# Patient Record
Sex: MALE | Race: WHITE | NOT HISPANIC OR LATINO
[De-identification: names, ages, dates, MRNs, and addresses within clinical notes are randomized per-mention and may not be internally consistent; named-entity substitution may affect disease eponyms.]

---

## 2019-02-21 ENCOUNTER — RESULT CHARGE (OUTPATIENT)
Age: 64
End: 2019-02-21

## 2019-02-22 ENCOUNTER — APPOINTMENT (OUTPATIENT)
Dept: INTERNAL MEDICINE | Facility: CLINIC | Age: 64
End: 2019-02-22
Payer: COMMERCIAL

## 2019-02-22 ENCOUNTER — NON-APPOINTMENT (OUTPATIENT)
Age: 64
End: 2019-02-22

## 2019-02-22 VITALS
WEIGHT: 217 LBS | HEIGHT: 72 IN | BODY MASS INDEX: 29.39 KG/M2 | HEART RATE: 79 BPM | DIASTOLIC BLOOD PRESSURE: 94 MMHG | SYSTOLIC BLOOD PRESSURE: 154 MMHG

## 2019-02-22 DIAGNOSIS — G89.29 PAIN IN RIGHT SHOULDER: ICD-10-CM

## 2019-02-22 DIAGNOSIS — I83.90 ASYMPTOMATIC VARICOSE VEINS OF UNSPECIFIED LOWER EXTREMITY: ICD-10-CM

## 2019-02-22 DIAGNOSIS — M25.512 PAIN IN RIGHT SHOULDER: ICD-10-CM

## 2019-02-22 DIAGNOSIS — M25.511 PAIN IN RIGHT SHOULDER: ICD-10-CM

## 2019-02-22 DIAGNOSIS — Z82.49 FAMILY HISTORY OF ISCHEMIC HEART DISEASE AND OTHER DISEASES OF THE CIRCULATORY SYSTEM: ICD-10-CM

## 2019-02-22 PROCEDURE — 36415 COLL VENOUS BLD VENIPUNCTURE: CPT

## 2019-02-22 PROCEDURE — 93000 ELECTROCARDIOGRAM COMPLETE: CPT

## 2019-02-22 PROCEDURE — 99396 PREV VISIT EST AGE 40-64: CPT | Mod: 25

## 2019-02-22 PROCEDURE — 81002 URINALYSIS NONAUTO W/O SCOPE: CPT

## 2019-02-22 RX ORDER — ASPIRIN 81 MG/1
81 TABLET ORAL DAILY
Refills: 0 | Status: ACTIVE | COMMUNITY
Start: 2019-02-22

## 2019-02-22 NOTE — HISTORY OF PRESENT ILLNESS
[FreeTextEntry1] : The patient is here for periodic reevaluation.\par \par He is known to have coronary artery disease, varicose veins, and an elevated cholesterol. 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  Additionally, he associates athletic events, and is not symptomatic when doing so.\par \par Family, the daily, cigarette smoker, he now smokes modestly and infrequently.  He has not had a chest CAT scan.  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  He has not had symptoms since that time.  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

## 2019-02-22 NOTE — RESULTS/DATA
[NSR] : normal sinus rhythm [LAD] : left axis deviation [Q Waves] : Q-waves are present [III] : III [AVF] : AVF [Inferior Wall] : of the inferior wall [No Interval Change] : no interval change

## 2019-02-22 NOTE — PHYSICAL EXAM

## 2019-02-22 NOTE — PHYSICAL EXAM

## 2019-02-22 NOTE — ASSESSMENT
[FreeTextEntry1] : He should have a screening CAT scan of his chest.\par \par He should have an exercise stress test (nuclear).\par \par He should have an echocardiogram and an MINDI\par \par Consideration should be given to reintroduction of statin drugs.

## 2019-02-22 NOTE — PHYSICAL EXAM

## 2019-02-22 NOTE — HISTORY OF PRESENT ILLNESS
[FreeTextEntry1] : The patient is here for periodic reevaluation.\par \par He is known to have coronary artery disease, varicose veins, and an elevated cholesterol. 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  Additionally, he associates athletic events, and is not symptomatic when doing so.\par \par Family, the daily, cigarette smoker, he now smokes modestly and infrequently.  He has not had a chest CAT scan.  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  He has not had symptoms since that time.  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

## 2019-02-22 NOTE — HISTORY OF PRESENT ILLNESS
[FreeTextEntry1] : The patient is here for periodic reevaluation.\par \par He is known to have coronary artery disease, varicose veins, and an elevated cholesterol. 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  Additionally, he associates athletic events, and is not symptomatic when doing so.\par \par Family, the daily, cigarette smoker, he now smokes modestly and infrequently.  He has not had a chest CAT scan.  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  He has not had symptoms since that time.  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

## 2019-02-23 LAB
ALBUMIN SERPL ELPH-MCNC: 4.7 G/DL
ALP BLD-CCNC: 115 U/L
ALT SERPL-CCNC: 29 U/L
APPEARANCE: CLEAR
AST SERPL-CCNC: 30 U/L
BASOPHILS # BLD AUTO: 0.07 K/UL
BASOPHILS NFR BLD AUTO: 1.1 %
BILIRUB SERPL-MCNC: 1 MG/DL
BILIRUBIN URINE: NEGATIVE
BLOOD URINE: NEGATIVE
BUN SERPL-MCNC: 13 MG/DL
CALCIUM SERPL-MCNC: 9.5 MG/DL
CHLORIDE SERPL-SCNC: 95 MMOL/L
CHOLEST SERPL-MCNC: 193 MG/DL
CHOLEST/HDLC SERPL: 4.4 RATIO
CO2 SERPL-SCNC: 25 MMOL/L
COLOR: NORMAL
CREAT SERPL-MCNC: 1.07 MG/DL
EOSINOPHIL # BLD AUTO: 0.24 K/UL
EOSINOPHIL NFR BLD AUTO: 3.8 %
GLUCOSE QUALITATIVE U: NEGATIVE
GLUCOSE SERPL-MCNC: 90 MG/DL
HBA1C MFR BLD HPLC: 5.5 %
HCT VFR BLD CALC: 48.9 %
HDLC SERPL-MCNC: 44 MG/DL
HGB BLD-MCNC: 16.3 G/DL
IMM GRANULOCYTES NFR BLD AUTO: 0.2 %
KETONES URINE: NEGATIVE
LDLC SERPL CALC-MCNC: 129 MG/DL
LEUKOCYTE ESTERASE URINE: NEGATIVE
LYMPHOCYTES # BLD AUTO: 0.65 K/UL
LYMPHOCYTES NFR BLD AUTO: 10.2 %
MAN DIFF?: NORMAL
MCHC RBC-ENTMCNC: 31 PG
MCHC RBC-ENTMCNC: 33.3 GM/DL
MCV RBC AUTO: 93 FL
MONOCYTES # BLD AUTO: 0.86 K/UL
MONOCYTES NFR BLD AUTO: 13.5 %
NEUTROPHILS # BLD AUTO: 4.52 K/UL
NEUTROPHILS NFR BLD AUTO: 71.2 %
NITRITE URINE: NEGATIVE
PH URINE: 6
PLATELET # BLD AUTO: 272 K/UL
POTASSIUM SERPL-SCNC: 5.4 MMOL/L
PROT SERPL-MCNC: 7.5 G/DL
PROTEIN URINE: NEGATIVE
PSA SERPL-MCNC: 0.98 NG/ML
RBC # BLD: 5.26 M/UL
RBC # FLD: 13 %
SODIUM SERPL-SCNC: 146 MMOL/L
SPECIFIC GRAVITY URINE: 1.01
TRIGL SERPL-MCNC: 100 MG/DL
TSH SERPL-ACNC: 0.98 UIU/ML
URATE SERPL-MCNC: 7.8 MG/DL
UROBILINOGEN URINE: NORMAL
WBC # FLD AUTO: 6.35 K/UL

## 2019-02-24 LAB — 25(OH)D3 SERPL-MCNC: 21.5 NG/ML

## 2019-02-25 ENCOUNTER — APPOINTMENT (OUTPATIENT)
Dept: INTERNAL MEDICINE | Facility: CLINIC | Age: 64
End: 2019-02-25

## 2019-03-04 ENCOUNTER — APPOINTMENT (OUTPATIENT)
Dept: INTERNAL MEDICINE | Facility: CLINIC | Age: 64
End: 2019-03-04
Payer: COMMERCIAL

## 2019-03-04 PROCEDURE — 93306 TTE W/DOPPLER COMPLETE: CPT

## 2019-03-04 PROCEDURE — 93922 UPR/L XTREMITY ART 2 LEVELS: CPT

## 2019-03-15 ENCOUNTER — RX RENEWAL (OUTPATIENT)
Age: 64
End: 2019-03-15

## 2019-03-18 ENCOUNTER — APPOINTMENT (OUTPATIENT)
Dept: INTERNAL MEDICINE | Facility: AMBULATORY MEDICAL SERVICES | Age: 64
End: 2019-03-18
Payer: COMMERCIAL

## 2019-03-18 PROBLEM — I83.90 ASYMPTOMATIC VARICOSE VEINS OF LOWER EXTREMITY: Status: ACTIVE | Noted: 2019-03-18

## 2019-03-18 PROBLEM — Z82.49 FAMILY HISTORY OF CORONARY ARTERY DISEASE: Status: ACTIVE | Noted: 2019-03-18

## 2019-03-18 PROCEDURE — G0121 COLON CA SCRN NOT HI RSK IND: CPT

## 2019-03-25 ENCOUNTER — MEDICATION RENEWAL (OUTPATIENT)
Age: 64
End: 2019-03-25

## 2019-03-26 ENCOUNTER — APPOINTMENT (OUTPATIENT)
Dept: INTERNAL MEDICINE | Facility: CLINIC | Age: 64
End: 2019-03-26

## 2019-04-08 ENCOUNTER — APPOINTMENT (OUTPATIENT)
Dept: CARDIOLOGY | Facility: CLINIC | Age: 64
End: 2019-04-08
Payer: COMMERCIAL

## 2019-04-08 PROCEDURE — 93015 CV STRESS TEST SUPVJ I&R: CPT

## 2019-04-08 PROCEDURE — A9500: CPT

## 2019-04-08 PROCEDURE — 78452 HT MUSCLE IMAGE SPECT MULT: CPT

## 2019-05-14 ENCOUNTER — APPOINTMENT (OUTPATIENT)
Dept: ORTHOPEDIC SURGERY | Facility: CLINIC | Age: 64
End: 2019-05-14
Payer: COMMERCIAL

## 2019-05-14 VITALS — HEIGHT: 72 IN | BODY MASS INDEX: 29.39 KG/M2 | WEIGHT: 217 LBS

## 2019-05-14 DIAGNOSIS — M75.122 COMPLETE ROTATOR CUFF TEAR OR RUPTURE OF LEFT SHOULDER, NOT SPECIFIED AS TRAUMATIC: ICD-10-CM

## 2019-05-14 DIAGNOSIS — M67.919 UNSPECIFIED DISORDER OF SYNOVIUM AND TENDON, UNSPECIFIED SHOULDER: ICD-10-CM

## 2019-05-14 PROCEDURE — 99214 OFFICE O/P EST MOD 30 MIN: CPT

## 2019-05-14 NOTE — END OF VISIT
[FreeTextEntry3] : All medical record entries made by the Anitaibe were at my, Dr. Jeff Sarmiento, direction and personally dictated by me on 05/14/2019. I have reviewed the chart and agree that the record accurately reflects my personal performance of the history, physical exam, assessment and plan. I have also personally directed, reviewed, and agreed with the chart.

## 2019-05-14 NOTE — ADDENDUM
[FreeTextEntry1] : I, Humble Forman, acted solely as a scribe for Dr. Jeff Sarimento on this date 05/14/2019.

## 2019-05-14 NOTE — PHYSICAL EXAM
[Normal RUE] : Right Upper Extremity: No scars, rashes, lesions, ulcers, skin intact [Normal LUE] : Left Upper Extremity: No scars, rashes, lesions, ulcers, skin intact [Normal Touch] : sensation intact for touch [Normal] : No swelling, no edema, normal pedal pulses and normal temperature [Poor Appearance] : well-appearing [Acute Distress] : not in acute distress [Obese] : not obese [de-identified] : \par Left Upper Extremity\par o Shoulder :\par ¦ Inspection/Palpation : There is localized tenderness present to the greater tuberosity and proximal biceps, no acromioclavicular tenderness, no swelling, no deformities \par ¦ Range of Motion : ACTIVE FORWARD ELEVATION: Measured at 145 degrees with pain, ACTIVE EXTERNAL ROTATION: Measured at 55 degrees, ACTIVE INTERNAL ROTATION: Measured at L5\par ¦ Strength : external rotation 3/5, internal rotation 4/5, supraspinatus 3/5\par ¦ Stability : no joint instability on provocative testing\par ¦ Tests/Signs : Belly press test (equivocal), Speed's test (+)\par o Upper Arm : no tenderness, no swelling, no deformities\par o Muscle Bulk : no atrophy\par o Sensation : sensation intact to light touch\par o Skin : no skin rash or discoloration\par o Vascular Exam : no edema, no cyanosis, radial and ulnar pulses normal  [de-identified] : MRI left shoulder performed at [facility] on 4/30/2019 was reviewed and reveals: \par Rotator cuff tendinosis and a complete full thickness tear of the subscapularis tendon as well as a a near complete tear of the supraspinatus. Partial-thickness articular sided tearing is seen at the infraspinatus insertion. \par There is a severe long head of the biceps tendinosis with a high grade partial tear in the rotator interval. \par Moderate glenohumeral arthrosis and tearing of the labrum.

## 2019-05-14 NOTE — DISCUSSION/SUMMARY
[de-identified] : The underlying pathophysiology was reviewed in great detail with the patient as well as the various treatment options, including analgesics, NSAIDS, Physical therapy, steroid injections, and surgery. \par \par The patient is now considering SURGICAL INTERVENTION. The risks and benefits of LEFT ROTATOR CUFF REPAIR were discussed in great detail today, including but not limited to bleeding, infection, nerve injury, DVT, allergy to the anesthetic or to the implants, persistent pain, stiffness, scarring, swelling or deformity. \par \par He will get back to us if he decides to go forward with the procedure.

## 2019-05-15 ENCOUNTER — APPOINTMENT (OUTPATIENT)
Dept: INTERNAL MEDICINE | Facility: CLINIC | Age: 64
End: 2019-05-15

## 2019-09-05 ENCOUNTER — EMERGENCY (EMERGENCY)
Facility: HOSPITAL | Age: 64
LOS: 1 days | Discharge: HOME | End: 2019-09-05
Attending: EMERGENCY MEDICINE | Admitting: STUDENT IN AN ORGANIZED HEALTH CARE EDUCATION/TRAINING PROGRAM
Payer: COMMERCIAL

## 2019-09-05 VITALS
TEMPERATURE: 98 F | SYSTOLIC BLOOD PRESSURE: 179 MMHG | OXYGEN SATURATION: 98 % | HEART RATE: 68 BPM | DIASTOLIC BLOOD PRESSURE: 113 MMHG | RESPIRATION RATE: 20 BRPM

## 2019-09-05 PROCEDURE — 99284 EMERGENCY DEPT VISIT MOD MDM: CPT

## 2019-09-05 NOTE — ED ADULT TRIAGE NOTE - CHIEF COMPLAINT QUOTE
Pt arrives c/o epistaxis beginning around 630pm - beginning w/o trauma.  States 1wk ago was elbowed in the nose playing basketball when it first started, did not seek medical attention at that time.  On Effien & baby ASA daily for anticoag, for CAD x11 stents. Pt arrives c/o epistaxis beginning around 630pm - beginning w/o trauma.  States 1wk ago was elbowed in the nose playing basketball when it first started, did not seek medical attention at that time.  On Effient & baby ASA daily for anticoag, for CAD x11 stents. Pt arrives c/o epistaxis beginning around 630pm - beginning w/o trauma.  States 1wk ago was elbowed in the nose playing basketball, had episode epistaxis but resolved, did not seek medical attention at that time.  On Effient & baby ASA daily for anticoag, hx CAD x11 stents.  Gauze/pressure applied to bridge nose.

## 2019-09-06 VITALS
TEMPERATURE: 96 F | SYSTOLIC BLOOD PRESSURE: 121 MMHG | RESPIRATION RATE: 16 BRPM | DIASTOLIC BLOOD PRESSURE: 73 MMHG | OXYGEN SATURATION: 97 % | HEART RATE: 65 BPM

## 2019-09-06 LAB
ANION GAP SERPL CALC-SCNC: 12 MMO/L — SIGNIFICANT CHANGE UP (ref 7–14)
APTT BLD: 28.6 SEC — SIGNIFICANT CHANGE UP (ref 27.5–36.3)
BUN SERPL-MCNC: 28 MG/DL — HIGH (ref 7–23)
CALCIUM SERPL-MCNC: 9.1 MG/DL — SIGNIFICANT CHANGE UP (ref 8.4–10.5)
CHLORIDE SERPL-SCNC: 103 MMOL/L — SIGNIFICANT CHANGE UP (ref 98–107)
CO2 SERPL-SCNC: 23 MMOL/L — SIGNIFICANT CHANGE UP (ref 22–31)
CREAT SERPL-MCNC: 0.9 MG/DL — SIGNIFICANT CHANGE UP (ref 0.5–1.3)
GLUCOSE SERPL-MCNC: 107 MG/DL — HIGH (ref 70–99)
HCT VFR BLD CALC: 42.9 % — SIGNIFICANT CHANGE UP (ref 39–50)
HGB BLD-MCNC: 14.7 G/DL — SIGNIFICANT CHANGE UP (ref 13–17)
INR BLD: 1.08 — SIGNIFICANT CHANGE UP (ref 0.88–1.17)
MCHC RBC-ENTMCNC: 30.4 PG — SIGNIFICANT CHANGE UP (ref 27–34)
MCHC RBC-ENTMCNC: 34.3 % — SIGNIFICANT CHANGE UP (ref 32–36)
MCV RBC AUTO: 88.8 FL — SIGNIFICANT CHANGE UP (ref 80–100)
NRBC # FLD: 0 K/UL — SIGNIFICANT CHANGE UP (ref 0–0)
PLATELET # BLD AUTO: 277 K/UL — SIGNIFICANT CHANGE UP (ref 150–400)
PMV BLD: 9.6 FL — SIGNIFICANT CHANGE UP (ref 7–13)
POTASSIUM SERPL-MCNC: 4.1 MMOL/L — SIGNIFICANT CHANGE UP (ref 3.5–5.3)
POTASSIUM SERPL-SCNC: 4.1 MMOL/L — SIGNIFICANT CHANGE UP (ref 3.5–5.3)
PROTHROM AB SERPL-ACNC: 12.4 SEC — SIGNIFICANT CHANGE UP (ref 9.8–13.1)
RBC # BLD: 4.83 M/UL — SIGNIFICANT CHANGE UP (ref 4.2–5.8)
RBC # FLD: 12.4 % — SIGNIFICANT CHANGE UP (ref 10.3–14.5)
SODIUM SERPL-SCNC: 138 MMOL/L — SIGNIFICANT CHANGE UP (ref 135–145)
WBC # BLD: 5.78 K/UL — SIGNIFICANT CHANGE UP (ref 3.8–10.5)
WBC # FLD AUTO: 5.78 K/UL — SIGNIFICANT CHANGE UP (ref 3.8–10.5)

## 2019-09-06 PROCEDURE — 99218: CPT

## 2019-09-06 RX ORDER — LABETALOL HCL 100 MG
10 TABLET ORAL ONCE
Refills: 0 | Status: COMPLETED | OUTPATIENT
Start: 2019-09-06 | End: 2019-09-06

## 2019-09-06 RX ORDER — MORPHINE SULFATE 50 MG/1
2 CAPSULE, EXTENDED RELEASE ORAL ONCE
Refills: 0 | Status: DISCONTINUED | OUTPATIENT
Start: 2019-09-06 | End: 2019-09-06

## 2019-09-06 RX ORDER — CEPHALEXIN 500 MG
500 CAPSULE ORAL EVERY 12 HOURS
Refills: 0 | Status: DISCONTINUED | OUTPATIENT
Start: 2019-09-06 | End: 2019-09-06

## 2019-09-06 RX ORDER — MORPHINE SULFATE 50 MG/1
4 CAPSULE, EXTENDED RELEASE ORAL ONCE
Refills: 0 | Status: DISCONTINUED | OUTPATIENT
Start: 2019-09-06 | End: 2019-09-06

## 2019-09-06 RX ORDER — TRANEXAMIC ACID 100 MG/ML
5 INJECTION, SOLUTION INTRAVENOUS ONCE
Refills: 0 | Status: COMPLETED | OUTPATIENT
Start: 2019-09-06 | End: 2019-09-06

## 2019-09-06 RX ORDER — CEPHALEXIN 500 MG
500 CAPSULE ORAL ONCE
Refills: 0 | Status: COMPLETED | OUTPATIENT
Start: 2019-09-06 | End: 2019-09-06

## 2019-09-06 RX ORDER — CEPHALEXIN 500 MG
1 CAPSULE ORAL
Qty: 8 | Refills: 0
Start: 2019-09-06 | End: 2019-09-09

## 2019-09-06 RX ORDER — HYDRALAZINE HCL 50 MG
10 TABLET ORAL ONCE
Refills: 0 | Status: COMPLETED | OUTPATIENT
Start: 2019-09-06 | End: 2019-09-06

## 2019-09-06 RX ADMIN — Medication 500 MILLIGRAM(S): at 17:47

## 2019-09-06 RX ADMIN — TRANEXAMIC ACID 5 MILLILITER(S): 100 INJECTION, SOLUTION INTRAVENOUS at 02:51

## 2019-09-06 RX ADMIN — Medication 10 MILLIGRAM(S): at 05:22

## 2019-09-06 RX ADMIN — Medication 10 MILLIGRAM(S): at 07:47

## 2019-09-06 RX ADMIN — Medication 10 MILLIGRAM(S): at 06:28

## 2019-09-06 NOTE — ED CDU PROVIDER DISPOSITION NOTE - PATIENT PORTAL LINK FT
You can access the FollowMyHealth Patient Portal offered by [facility] by registering at the following website: [email or web address]. By joining [employer]’s FollowMyHealth portal, you will also be able to view your health information using other applications (apps) compatible with our system.

## 2019-09-06 NOTE — ED CDU PROVIDER DISPOSITION NOTE - CARE PROVIDER_API CALL
Herbert Davis)  Otolaryngology  [address], [address]  [place] [address]  Phone: [phone]  Fax: [phone]  Follow Up Time:

## 2019-09-06 NOTE — ED ADULT NURSE NOTE - OBJECTIVE STATEMENT
pt a*o x3 c/o epistaxis x 5 hours. + anticoagulant use- prasugrel. denies any recent trauma but reports last week he was elbowed in the nose and had some bleeding which resolved. pt actively bleeding via left nostril. md at bedside. nad noted. will monitor

## 2019-09-06 NOTE — ED PROVIDER NOTE - OBJECTIVE STATEMENT
63yo male with h/o CAD s/p stents, HLD who present with acute epistaxis. This started 5 hours ago while driving and has not stopped. Blood has been coming out of left nostril only. Six days ago he got elbowed while playing basketball and also had few hours of left nostril nosebleed that resolved by holding it with pressure. He is on aspirin and prasugrel. Denied any new trauma, fall, CP, palpitation, SOB, skin bruising, hematemesis, hemoptysis, melena, BRBPR. Endorsed mild HA that just started few minutes ago that he attributed to blood loss. He has tried holding pressure and ice on nose w/o relief of nosebleed. 65yo male with h/o CAD s/p stents, HLD who present with acute epistaxis. This started 5 hours ago while driving and has not stopped. Blood has been coming out of left nostril only. Six days ago he got elbowed while playing basketball and also had few hours of left nostril nosebleed that resolved by holding it with pressure. He is on aspirin and prasugrel. Denied any new trauma, fall, CP, palpitation, SOB, skin bruising, hematemesis, hemoptysis, melena, BRBPR. Endorsed mild HA that just started few minutes ago that he attributed to blood loss. He has tried holding pressure and ice on nose w/o relief of nosebleed.    ATTENDING (Dino SINGLETARY): 65 y/o M with h/o hyperlipidemia, HTN, CAD s/p stents on asa and ranexa here with epistaxis.  Pt reports spontaneous bleeding from left nare while driving tonight, uncontrolled at home with direct pressure.  Pt reports he got hit in the face playing basketball last weekend.  Had bleeding from the same left nare at the time, but stopped on its own.  No other trauma or injury, but pt states he has not been taking his ranexa and aspirin all week because of the bleeding.  No other complaints.

## 2019-09-06 NOTE — ED CDU PROVIDER DISPOSITION NOTE - ATTENDING CONTRIBUTION TO CARE
S/p packing with ENT. Bleeding resolved to scant oozing. D/w cardiologist, pt should stop ASA and continue effient. Pt offered to be monitored overnight but desired to go home at this time since bleeding seemingly resolved. Explained could not predict if rebleeding may occur and he expressed understanding

## 2019-09-06 NOTE — ED CDU PROVIDER INITIAL DAY NOTE - DETAILS
64 y.o male with epistaxis on AC- sent to CDU for observation for rebleeding/ reassessments, ENT following.

## 2019-09-06 NOTE — ED CDU PROVIDER DISPOSITION NOTE - NSFOLLOWUPINSTRUCTIONS_ED_ALL_ED_FT
Please follow up with Dr. Davis (ENT) on Monday 9/9 as you have already arranged. Nasal packing likely to be removed at that time.  Take Keflex 500mg two times a day while nasal packing in place  Use nasal saline in RIGHT nare while packing in place. Do not put anything in left nare.  Hold your anti-platelet medications until your follow-up appointment with ENT on Monday.  Please follow up with your primary care doctor. Please follow up with Dr. Davis (ENT) on Monday 9/9 as you have already arranged. Nasal packing likely to be removed at that time.  Take Keflex 500mg two times a day while nasal packing in place  Use nasal saline in RIGHT nare while packing in place. Do not put anything in left nare.  Continue taking Effient but HOLD YOUR ASPIRIN until your follow-up appointment with ENT on Monday. This was discussed with your cardiologist.  Please follow up with your primary care doctor. - Please follow up with Dr. Davis (ENT) on Monday 9/9 as you have already arranged. Nasal packing likely to be removed at that time.  - Please follow up with your primary care doctor and cardiologist within one week.  - Take Keflex 500mg two times a day while nasal packing in place  - Use nasal saline in RIGHT nare while packing in place. Do not put anything in either nare, especially the left nare.  - Keep packing in place. Change gauze dressing as needed  - Continue taking Effient but HOLD YOUR ASPIRIN until your follow-up appointment with ENT on Monday. This was discussed with your cardiologist.  - Return to ER with any recurring bleeding, dizziness, lightheadedness, coughing up blood, shortness of breath or anything else concerning to you

## 2019-09-06 NOTE — ED CDU PROVIDER INITIAL DAY NOTE - ATTENDING CONTRIBUTION TO CARE
64M h/o CAD on plasugrel and aspirin presented to the ED with acute epistaxis. Initially given Afrin, rhino rocket and TXA with continued bleeding. ENT saw o/n, packed with improvement this AM. Pt states he no longer feels sensation of post-nasal drip. Hypotensive to 90s/50s on CDU eval but had received Labetalol 10mg IVP x1 and Hydralazine 10mg IVP x1 for BP control to help with bleeding.   Exam: awake and alert, NAD; HEENT L nare packed with slight blood tinged gauze taped below nostrils; CV RRR; Pulm clear lungs; Abd soft, nt, nd  Plan  - keflex while nasal packing in place  - nasal saline to R nare, do not disturb L nare  - observe in CDU, if no repeat bleeding in 6 hours can dc with ENT f/u in 3 days for packing removal 64M h/o CAD s/p stents (4 years ago) on plasugrel and aspirin (pt held for last 2 days due to nose bleed) presented to the ED with acute epistaxis. Initially given Afrin, rhino rocket and TXA with continued bleeding. ENT saw o/n, packed with improvement this AM. Pt states he no longer feels sensation of post-nasal drip. Hypotensive to 90s/50s on CDU eval but had received Labetalol 10mg IVP x1 and Hydralazine 10mg IVP x1 for BP control to help with bleeding.   Exam: awake and alert, NAD; HEENT L nare packed with slight blood tinged gauze taped below nostrils; CV RRR; Pulm clear lungs; Abd soft, nt, nd  Plan  - keflex while nasal packing in place  - nasal saline to R nare, do not disturb L nare  - observe in CDU, if no repeat bleeding in 6 hours can dc with ENT f/u in 3 days for packing removal

## 2019-09-06 NOTE — ED CDU PROVIDER DISPOSITION NOTE - CLINICAL COURSE
64M h/o CAD s/p stents, HLD who present with acute epistaxis from L nare x5 hr prior to ED arrival. Six days ago he got elbowed while playing basketball and also had few hours of left nostril nosebleed that resolved by holding pressure. He is on aspirin and prasugrel. Denied any new trauma, fall, CP, palpitation, SOB, skin bruising, hematemesis, hemoptysis, melena, BRBPR. Endorsed mild HA that just started few minutes ago that he attributed to blood loss. He has tried holding pressure and ice on nose w/o relief of nosebleed. In ED, rhinorocket placed, also given TXA and nasal spray without resolution of bleed. ENT consulted and packing placed. Bleeding stopped at that time. Pt monitored in CDU ~7hrs without recurrent bleed. Pt had initially been HTN while ENT seeing pt. Pt given Labetalol 10mg IVP x2 and Hydralazine 10mg IVP x1 with better control. Pt now with stable BP while in CDU (120-130s/70-80s)

## 2019-09-06 NOTE — ED CDU PROVIDER INITIAL DAY NOTE - PROGRESS NOTE DETAILS
No further bleeding. Packing remains in place. No post-nasal bleeding noted. BP improved with levels ranging from 120-130s/70-80s. Pt stable for dc. Arranged ENT follow-up for Monday while in CDU. Keflex given in ED, will give 4 day course for while packing in place.

## 2019-09-06 NOTE — ED CDU PROVIDER INITIAL DAY NOTE - OBJECTIVE STATEMENT
63yo male with h/o CAD s/p stents, HLD who present with acute epistaxis. This started 5 hours ago while driving and has not stopped. Blood has been coming out of left nostril only. Six days ago he got elbowed while playing basketball and also had few hours of left nostril nosebleed that resolved by holding it with pressure. He is on aspirin and prasugrel. Denied any new trauma, fall, CP, palpitation, SOB, skin bruising, hematemesis, hemoptysis, melena, BRBPR. Endorsed mild HA that just started few minutes ago that he attributed to blood loss. He has tried holding pressure and ice on nose w/o relief of nosebleed.    CDU LUISA Nowak: HPI reviewed above. 64 y.o male cad on plasugrel and aspirin presented to the ED with acute epistaxis, seen by ENT who did nasal packing. recommend obs for rebleeding, nasal sprays, abx while packing is in place and observation.

## 2019-09-06 NOTE — ED PROVIDER NOTE - PROGRESS NOTE DETAILS
Nosebleeding appeared to have stopped. No longer tasting blood or spitting out blood clots. Started spitting blood clots again. Took out packing and nosebleed continued. Applied rocketpacking with TXA. Will reassess. Dino SINGLETARY: Pt cont to bleed through rhinorocket.  ENT consulted for uncontrolled epistaxis.  ENT attempting visualization and packing at bedside, pt becoming increasingly hypertensive during evaluation.  ENT unable to control epistaxis, will give IV meds to control BP in setting of active bleeding, reassess. Dian Polk, resident MD: ENT evaluated pt and blood pressure better controlled with minimal dripping. pt will require abx and discharge with instructions for f/u in 3 days for removal in either outpt clinic or back in the ED. will observe in CDU for further monitoring of further bleeding as he continues to have leaking.

## 2019-09-06 NOTE — ED PROVIDER NOTE - CLINICAL SUMMARY MEDICAL DECISION MAKING FREE TEXT BOX
64yoM with h/o CAD s/p stent and HLD who present with 5hr of epistaxis.   Plan:  gauze and mesh packing  afrin nasal spray  reassess 64yoM with h/o CAD s/p stent and HLD who present with 5hr of epistaxis.   Plan:  gauze and mesh packing  afrin nasal spray  TXA   reassess

## 2019-09-06 NOTE — ED ADULT NURSE NOTE - CHIEF COMPLAINT QUOTE
Pt arrives c/o epistaxis beginning around 630pm - beginning w/o trauma.  States 1wk ago was elbowed in the nose playing basketball, had episode epistaxis but resolved, did not seek medical attention at that time.  On Effient & baby ASA daily for anticoag, hx CAD x11 stents.  Gauze/pressure applied to bridge nose.

## 2020-10-16 NOTE — ED ADULT NURSE NOTE - CAS DISCH CONDITION
Pt up to chair with OT/PT. Up all day until 6pm.   Lifted back to bed with lift system. Tolerated well. Pt incontinent of bowel and bladder this morning. No further void today. Family at bedside. Questions answered. Pt with neglect of Right side . Dwight Alcaraz Difficulty with forming thoughts and expressing self. Unable to read [employer] or words. 17 on NIH stroke scale. Stable

## 2021-06-24 ENCOUNTER — INPATIENT (INPATIENT)
Facility: HOSPITAL | Age: 66
LOS: 0 days | Discharge: HOME | DRG: 42 | End: 2021-06-25
Attending: PSYCHIATRY & NEUROLOGY | Admitting: PSYCHIATRY & NEUROLOGY
Payer: COMMERCIAL

## 2021-06-24 VITALS
RESPIRATION RATE: 20 BRPM | OXYGEN SATURATION: 97 % | WEIGHT: 214.07 LBS | DIASTOLIC BLOOD PRESSURE: 90 MMHG | HEIGHT: 72 IN | SYSTOLIC BLOOD PRESSURE: 190 MMHG | HEART RATE: 66 BPM | TEMPERATURE: 99 F

## 2021-06-24 DIAGNOSIS — R20.0 ANESTHESIA OF SKIN: ICD-10-CM

## 2021-06-24 PROBLEM — I10 ESSENTIAL (PRIMARY) HYPERTENSION: Chronic | Status: ACTIVE | Noted: 2019-09-06

## 2021-06-24 LAB
ALBUMIN SERPL ELPH-MCNC: 4.6 G/DL — SIGNIFICANT CHANGE UP (ref 3.3–5)
ALP SERPL-CCNC: 127 U/L — HIGH (ref 40–120)
ALT FLD-CCNC: 33 U/L — SIGNIFICANT CHANGE UP (ref 10–45)
ANION GAP SERPL CALC-SCNC: 13 MMOL/L — SIGNIFICANT CHANGE UP (ref 5–17)
ANION GAP SERPL CALC-SCNC: 14 MMOL/L — SIGNIFICANT CHANGE UP (ref 5–17)
APTT BLD: 28.4 SEC — SIGNIFICANT CHANGE UP (ref 27.5–35.5)
AST SERPL-CCNC: 39 U/L — SIGNIFICANT CHANGE UP (ref 10–40)
BASE EXCESS BLDV CALC-SCNC: 0.3 MMOL/L — SIGNIFICANT CHANGE UP (ref -2–2)
BASOPHILS # BLD AUTO: 0.08 K/UL — SIGNIFICANT CHANGE UP (ref 0–0.2)
BASOPHILS NFR BLD AUTO: 1.2 % — SIGNIFICANT CHANGE UP (ref 0–2)
BILIRUB SERPL-MCNC: 0.7 MG/DL — SIGNIFICANT CHANGE UP (ref 0.2–1.2)
BUN SERPL-MCNC: 24 MG/DL — HIGH (ref 7–23)
BUN SERPL-MCNC: 25 MG/DL — HIGH (ref 7–23)
CALCIUM SERPL-MCNC: 9.1 MG/DL — SIGNIFICANT CHANGE UP (ref 8.4–10.5)
CALCIUM SERPL-MCNC: 9.3 MG/DL — SIGNIFICANT CHANGE UP (ref 8.4–10.5)
CHLORIDE SERPL-SCNC: 103 MMOL/L — SIGNIFICANT CHANGE UP (ref 96–108)
CHLORIDE SERPL-SCNC: 104 MMOL/L — SIGNIFICANT CHANGE UP (ref 96–108)
CO2 BLDV-SCNC: 27 MMOL/L — SIGNIFICANT CHANGE UP (ref 22–30)
CO2 SERPL-SCNC: 19 MMOL/L — LOW (ref 22–31)
CO2 SERPL-SCNC: 21 MMOL/L — LOW (ref 22–31)
CREAT SERPL-MCNC: 0.95 MG/DL — SIGNIFICANT CHANGE UP (ref 0.5–1.3)
CREAT SERPL-MCNC: 0.97 MG/DL — SIGNIFICANT CHANGE UP (ref 0.5–1.3)
CRP SERPL-MCNC: <3 MG/L — SIGNIFICANT CHANGE UP (ref 0–4)
EOSINOPHIL # BLD AUTO: 0.35 K/UL — SIGNIFICANT CHANGE UP (ref 0–0.5)
EOSINOPHIL NFR BLD AUTO: 5.4 % — SIGNIFICANT CHANGE UP (ref 0–6)
ERYTHROCYTE [SEDIMENTATION RATE] IN BLOOD: 16 MM/HR — SIGNIFICANT CHANGE UP (ref 0–20)
GAS PNL BLDV: SIGNIFICANT CHANGE UP
GLUCOSE SERPL-MCNC: 89 MG/DL — SIGNIFICANT CHANGE UP (ref 70–99)
GLUCOSE SERPL-MCNC: 91 MG/DL — SIGNIFICANT CHANGE UP (ref 70–99)
HCO3 BLDV-SCNC: 25 MMOL/L — SIGNIFICANT CHANGE UP (ref 21–29)
HCT VFR BLD CALC: 45.3 % — SIGNIFICANT CHANGE UP (ref 39–50)
HCT VFR BLD CALC: 48.5 % — SIGNIFICANT CHANGE UP (ref 39–50)
HGB BLD-MCNC: 15.4 G/DL — SIGNIFICANT CHANGE UP (ref 13–17)
HGB BLD-MCNC: 16.9 G/DL — SIGNIFICANT CHANGE UP (ref 13–17)
IMM GRANULOCYTES NFR BLD AUTO: 0.5 % — SIGNIFICANT CHANGE UP (ref 0–1.5)
INR BLD: 0.98 RATIO — SIGNIFICANT CHANGE UP (ref 0.88–1.16)
LYMPHOCYTES # BLD AUTO: 1.8 K/UL — SIGNIFICANT CHANGE UP (ref 1–3.3)
LYMPHOCYTES # BLD AUTO: 27.7 % — SIGNIFICANT CHANGE UP (ref 13–44)
MCHC RBC-ENTMCNC: 30.3 PG — SIGNIFICANT CHANGE UP (ref 27–34)
MCHC RBC-ENTMCNC: 31.2 PG — SIGNIFICANT CHANGE UP (ref 27–34)
MCHC RBC-ENTMCNC: 34 GM/DL — SIGNIFICANT CHANGE UP (ref 32–36)
MCHC RBC-ENTMCNC: 34.8 GM/DL — SIGNIFICANT CHANGE UP (ref 32–36)
MCV RBC AUTO: 89 FL — SIGNIFICANT CHANGE UP (ref 80–100)
MCV RBC AUTO: 89.5 FL — SIGNIFICANT CHANGE UP (ref 80–100)
MONOCYTES # BLD AUTO: 0.73 K/UL — SIGNIFICANT CHANGE UP (ref 0–0.9)
MONOCYTES NFR BLD AUTO: 11.2 % — SIGNIFICANT CHANGE UP (ref 2–14)
NEUTROPHILS # BLD AUTO: 3.5 K/UL — SIGNIFICANT CHANGE UP (ref 1.8–7.4)
NEUTROPHILS NFR BLD AUTO: 54 % — SIGNIFICANT CHANGE UP (ref 43–77)
NRBC # BLD: 0 /100 WBCS — SIGNIFICANT CHANGE UP (ref 0–0)
NRBC # BLD: 0 /100 WBCS — SIGNIFICANT CHANGE UP (ref 0–0)
PCO2 BLDV: 44 MMHG — SIGNIFICANT CHANGE UP (ref 35–50)
PH BLDV: 7.37 — SIGNIFICANT CHANGE UP (ref 7.35–7.45)
PLATELET # BLD AUTO: 283 K/UL — SIGNIFICANT CHANGE UP (ref 150–400)
PLATELET # BLD AUTO: 286 K/UL — SIGNIFICANT CHANGE UP (ref 150–400)
PO2 BLDV: 37 MMHG — SIGNIFICANT CHANGE UP (ref 25–45)
POTASSIUM SERPL-MCNC: 4.3 MMOL/L — SIGNIFICANT CHANGE UP (ref 3.5–5.3)
POTASSIUM SERPL-MCNC: 5.5 MMOL/L — HIGH (ref 3.5–5.3)
POTASSIUM SERPL-SCNC: 4.3 MMOL/L — SIGNIFICANT CHANGE UP (ref 3.5–5.3)
POTASSIUM SERPL-SCNC: 5.5 MMOL/L — HIGH (ref 3.5–5.3)
PROT SERPL-MCNC: 7.9 G/DL — SIGNIFICANT CHANGE UP (ref 6–8.3)
PROTHROM AB SERPL-ACNC: 11.8 SEC — SIGNIFICANT CHANGE UP (ref 10.6–13.6)
RBC # BLD: 5.09 M/UL — SIGNIFICANT CHANGE UP (ref 4.2–5.8)
RBC # BLD: 5.42 M/UL — SIGNIFICANT CHANGE UP (ref 4.2–5.8)
RBC # FLD: 12.8 % — SIGNIFICANT CHANGE UP (ref 10.3–14.5)
RBC # FLD: 12.9 % — SIGNIFICANT CHANGE UP (ref 10.3–14.5)
SAO2 % BLDV: 67 % — SIGNIFICANT CHANGE UP (ref 67–88)
SARS-COV-2 RNA SPEC QL NAA+PROBE: SIGNIFICANT CHANGE UP
SODIUM SERPL-SCNC: 136 MMOL/L — SIGNIFICANT CHANGE UP (ref 135–145)
SODIUM SERPL-SCNC: 138 MMOL/L — SIGNIFICANT CHANGE UP (ref 135–145)
TROPONIN T, HIGH SENSITIVITY RESULT: 21 NG/L — SIGNIFICANT CHANGE UP (ref 0–51)
WBC # BLD: 5.51 K/UL — SIGNIFICANT CHANGE UP (ref 3.8–10.5)
WBC # BLD: 6.49 K/UL — SIGNIFICANT CHANGE UP (ref 3.8–10.5)
WBC # FLD AUTO: 5.51 K/UL — SIGNIFICANT CHANGE UP (ref 3.8–10.5)
WBC # FLD AUTO: 6.49 K/UL — SIGNIFICANT CHANGE UP (ref 3.8–10.5)

## 2021-06-24 PROCEDURE — 70551 MRI BRAIN STEM W/O DYE: CPT | Mod: 26

## 2021-06-24 PROCEDURE — 99291 CRITICAL CARE FIRST HOUR: CPT

## 2021-06-24 PROCEDURE — 70544 MR ANGIOGRAPHY HEAD W/O DYE: CPT | Mod: 26,59

## 2021-06-24 PROCEDURE — 70450 CT HEAD/BRAIN W/O DYE: CPT | Mod: 26,MA

## 2021-06-24 PROCEDURE — 70547 MR ANGIOGRAPHY NECK W/O DYE: CPT | Mod: 26

## 2021-06-24 RX ORDER — ASPIRIN/CALCIUM CARB/MAGNESIUM 324 MG
81 TABLET ORAL DAILY
Refills: 0 | Status: DISCONTINUED | OUTPATIENT
Start: 2021-06-24 | End: 2021-06-25

## 2021-06-24 RX ORDER — METOPROLOL TARTRATE 50 MG
1 TABLET ORAL
Qty: 0 | Refills: 0 | DISCHARGE

## 2021-06-24 RX ORDER — ASPIRIN/CALCIUM CARB/MAGNESIUM 324 MG
1 TABLET ORAL
Qty: 0 | Refills: 0 | DISCHARGE

## 2021-06-24 RX ORDER — ATORVASTATIN CALCIUM 80 MG/1
80 TABLET, FILM COATED ORAL AT BEDTIME
Refills: 0 | Status: DISCONTINUED | OUTPATIENT
Start: 2021-06-24 | End: 2021-06-25

## 2021-06-24 RX ORDER — CELECOXIB 200 MG/1
1 CAPSULE ORAL
Qty: 0 | Refills: 0 | DISCHARGE

## 2021-06-24 RX ORDER — ENOXAPARIN SODIUM 100 MG/ML
40 INJECTION SUBCUTANEOUS DAILY
Refills: 0 | Status: DISCONTINUED | OUTPATIENT
Start: 2021-06-24 | End: 2021-06-25

## 2021-06-24 RX ADMIN — Medication 81 MILLIGRAM(S): at 17:41

## 2021-06-24 RX ADMIN — ENOXAPARIN SODIUM 40 MILLIGRAM(S): 100 INJECTION SUBCUTANEOUS at 17:41

## 2021-06-24 RX ADMIN — ATORVASTATIN CALCIUM 80 MILLIGRAM(S): 80 TABLET, FILM COATED ORAL at 21:06

## 2021-06-24 NOTE — H&P ADULT - HISTORY OF PRESENT ILLNESS
MRN-[id number]  Patient is a 65y old  Male who presents with a chief complaint of   HPI:      PAST MEDICAL & SURGICAL HISTORY:  Hypertension    No significant past surgical history      FAMILY HISTORY:    Social Hx:  Nonsmoker, no drug or alcohol use    Home Medications:    MEDICATIONS  (STANDING):    MEDICATIONS  (PRN):    Allergies  IV Contrast (Other)    Intolerances      REVIEW OF SYSTEMS  General:	  Skin/Breast:	  Ophthalmologic:  ENMT:	  Respiratory and Thorax:	  Cardiovascular:	  Gastrointestinal:	  Genitourinary:	  Musculoskeletal:	  Neurological:	  Psychiatric:	  Hematology/Lymphatics:	  Endocrine:	  Allergic/Immunologic:	    ROS: Pertinent positives in HPI, all other ROS were reviewed and are negative.                 MRN-[id number]    HPI:  Patient is a 66 yo Rt handed M with pmh of HTN and CAD with stents placement on ASA and Effient presents to [facility] for Rt LE weakness and numbness with feeling of being off balanced. Patient states LWK was around 2330. Patient went to dinner and had 1 glass of wine and arrived home around 2300 pm. Then at 2330pm patient  was changing his clothes when he fell on his Rt side. Patient did not have LOC and no headstrike. Patient states his rt leg felt numb and weak and could not get up. Patient sat down for an hour until he felt comfortable to walk. Patient got up and was using side rails to help walk around the house. Patient states he had a feeling of being off balance, however does not describe it as room spinning,  which started around the same time the RT LE weakness and numbness. Patient states the episode lasted for about 3 hrs and when patient woke up this AM, he wanted to get it checked out and felt off balance as well. Patients the numbness and weakness have resolved. Patient however feels he is not baseline. Patient takes ASA 81mg daily. Patient was on Plavix but was switched to EFfient a couple years ago, but is now being titrated off. Patient did not take Effient yesterday and only took ASA 81mg. Code stroke was called. Patient had NIHSS 0 and MRS 0. Patient was not a tPA candidate due to being out of timeframe. Patient has contrast allergy thus could not get CTA.

## 2021-06-24 NOTE — H&P ADULT - ASSESSMENT
Patient is a 64 yo Rt handed M with pmh of HTN and CAD with stents placement on ASA and Effient presents to [facility] for Rt LE weakness and numbness with feeling of being off balanced. Patient states LWK was around 2330. Patient went to dinner and had 1 glass of wine and arrived home around 2300 pm. Then at 2330pm patient  was changing his clothes when he fell on his Rt side. Patient did not have LOC and no headstrike. Patient states his rt leg felt numb and weak and could not get up. Patient sat down for an hour until he felt comfortable to walk. Patient got up and was using side rails to help walk around the house. . Patient states the episode lasted for about 3 hrs and when patient woke up this AM, he wanted to get it checked out and felt off balance. Ct head w/o contrast was negative for acute abnormalities. Patient will continue ASA 81mg and will hold Effient until MRA results are completed. Patient will need to have further evaluation with MRI head and MRA H/N to assess vessel imaging.     Impression/Plan:   Acute RT LE sided hemisensory deficit now resolving with continual off balance sensation 2/2 to Left sided brain dysfunction. Etiology unknown.   Recommendations:   Keep SBP Permission for first 24hrs then gradual normotension   Continue ASA 81mg and Lipitor 80mg QHS   Brain MRI w/o contrast   MRA of H/N to assess vessels   Echo with bubble study   Baseline EKG   Telemetry   Lipid panel, tsh, hgb a1c   routine labs ordered and pending , repeat K level   Neuro checks   Cardiac diet   Pt/Ot   DVT ppx     Case was discussed with Stroke Neurology Fellow. Case to be seen with Stroke Neurology Attending.

## 2021-06-24 NOTE — H&P ADULT - NSHPREVIEWOFSYSTEMS_GEN_ALL_CORE
REVIEW OF SYSTEMS  General: Denies fever and chills 	  Ophthalmologic: Denies blurred vision   Respiratory and Thorax: Denies sob   Cardiovascular: Denies chest pain 	  Musculoskeletal: Denies muscle and joint pain   Neurological: Mentions RT LE numbness and weakness   	    ROS: Pertinent positives in HPI, all other ROS were reviewed and are negative.

## 2021-06-24 NOTE — ED ADULT NURSE NOTE - BEFAST ARM SIDE DRIFT
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  Patient had her surgery on July 30, 2018 consisting of a robotic assisted total laparoscopic hysterectomy, bilateral salpingo-oophorectomy and pelvic lymph node dissection  Patient comes in today for her postoperative visit with no complaints  Endometrial cancer ([facility] [address] )    6/14/2018 Biopsy     FIGO grade 1 endometrioid adenocarcinoma of the uterus  Biopsy performed by Dr Giovani Shelley  7/30/2018 Surgery     Robotic assisted total laparoscopic hysterectomy, bilateral salpingo-oophorectomy and pelvic lymph node dissection    Final pathology revealed stage IA grade 1 endometrioid adenocarcinoma (3 3 x 1 7 cm, 2/12mm invasion, NO LVSI, negative washings)            Review of Systems    Patient Active Problem List   Diagnosis    Actinic keratosis    Seborrheic keratosis    Screening for skin condition    Diabetes mellitus type 2, controlled, without complications ([address] )    Dyslipidemia, goal LDL below 70    Dysmetabolic syndrome X    Benign essential hypertension    Obesity    Lichen sclerosus    Endometrial cancer ([address] )    Preoperative evaluation to rule out surgical contraindication     Social History     Social History    Marital status:      Spouse name: N/A    Number of children: 3    Years of education: N/A     Occupational History    nurse      full-time     Social History Main Topics    Smoking status: Never Smoker    Smokeless tobacco: Never Used    Alcohol use 1 8 oz/week     3 Glasses of wine per week      Comment: (history) occasional   per week    Drug use: No    Sexual activity: Not Currently     Other Topics Concern    Not on file Social History Narrative    Active advance directive    DME- freestyle lite blood glucose meter device kit QID         Past Medical History:   Diagnosis Date    Abnormal mammogram     Abnormal weight gain     Achilles tendinitis     Angina pectoris (HCC)     Asthma     COPD (chronic obstructive pulmonary disease) (HCC)     Diabetes mellitus (HCC)     Disc degeneration, lumbar     Disease of thyroid gland     hypo    Dyslipidemia     Early satiety     Edema     idiopathic peripheral    Enthesopathy     hip region    Esophagitis, reflux     GERD (gastroesophageal reflux disease)     Hard to intubate     difficulty with intubation and had to be intubated twice    Hypercholesterolemia     Hypertension     IBS (irritable bowel syndrome)     Irritable bowel syndrome     Osteoarthritis     Rosacea     Sacroiliitis ([facility])        Current Outpatient Prescriptions:     albuterol (VENTOLIN HFA) 90 mcg/act inhaler, Inhale 2 puffs 4 (four) times a day as needed, Disp: , Rfl:     aspirin (ASPIR-81) 81 mg EC tablet, Take by mouth, Disp: , Rfl:     B Complex Vitamins (B COMPLEX 1 PO), Take by mouth, Disp: , Rfl:     Calcium Carbonate-Vitamin D (CALCIUM 600+D) 600-200 MG-UNIT TABS, Take by mouth, Disp: , Rfl:     cetirizine (ZyrTEC) 10 MG chewable tablet, Chew 10 mg daily, Disp: , Rfl:     clindamycin (CLEOCIN) 300 MG capsule, Take by mouth Before dental procedures 600mg , Disp: , Rfl:     clotrimazole-betamethasone (LOTRISONE) 1-0 05 % cream, Apply topically daily at bedtime as needed (for itching), Disp: 45 g, Rfl: 1    ezetimibe (ZETIA) 10 mg tablet, TAKE 1 TABLET DAILY, Disp: 90 tablet, Rfl: 2    furosemide (LASIX) 20 mg tablet, Take 1 tablet (20 mg total) by mouth daily, Disp: 90 tablet, Rfl: 3    glipiZIDE (GLUCOTROL XL) 2 5 mg 24 hr tablet, Take 1 tablet (2 5 mg total) by mouth daily, Disp: 90 tablet, Rfl: 3    insulin lispro (HUMALOG) 100 units/mL injection, Inject under the skin Only when on steroids or with a sick day , Disp: , Rfl:     KRILL OIL PO, Take by mouth, Disp: , Rfl:     levothyroxine 75 mcg tablet, Take 1 tablet (75 mcg total) by mouth daily, Disp: 90 tablet, Rfl: 3    LINZESS 145 MCG CAPS, TAKE 1 CAPSULE IN THE MORNING AT LEAST 30 MINUTES BEFORE BREAKFAST DAILY, Disp: 90 capsule, Rfl: 3    losartan (COZAAR) 50 mg tablet, Take 1 tablet (50 mg total) by mouth daily, Disp: 90 tablet, Rfl: 3    Magnesium Oxide 400 MG CAPS, Take by mouth, Disp: , Rfl:     meloxicam (MOBIC) 15 mg tablet, Take 1 tablet (15 mg total) by mouth daily as needed (P r n  pain), Disp: 90 tablet, Rfl: 3    metFORMIN (GLUCOPHAGE) 1000 MG tablet, Take 1,000 mg by mouth 2 (two) times a day with meals, Disp: , Rfl:     metFORMIN (GLUCOPHAGE-XR) 500 mg 24 hr tablet, Take 500 mg by mouth daily with breakfast  , Disp: , Rfl:     Omega-3 Fatty Acids (FISH OIL) 1200 MG CAPS, Take by mouth, Disp: , Rfl:     pantoprazole (PROTONIX) 40 mg tablet, Take 1 tablet (40 mg total) by mouth daily, Disp: 90 tablet, Rfl: 3    potassium chloride (MICRO-K) 10 MEQ CR capsule, Take 1 capsule (10 mEq total) by mouth daily (Patient taking differently: Take 20 mEq by mouth daily  ), Disp: 90 capsule, Rfl: 3    rosuvastatin (CRESTOR) 20 MG tablet, TAKE 1 TABLET DAILY, Disp: 90 tablet, Rfl: 2    saccharomyces boulardii (FLORASTOR) 250 mg capsule, Take 250 mg by mouth 3 (three) times a day, Disp: , Rfl:     BD INSULIN SYRINGE ULTRAFINE 31G X 15/64" 0 3 ML MISC, , Disp: , Rfl:   Allergies   Allergen Reactions    Amoxicillin-Pot Clavulanate Anaphylaxis, Hives, Itching and Facial Swelling    Erythromycin Hives, Itching, Swelling, Vomiting, Throat Swelling, Nasal Congestion and Facial Swelling    Meperidine Anaphylaxis    Morphine Hives, Itching, Swelling, Other (See Comments) and Syncope     hypotension    Nabumetone Hives, Itching and Swelling    Penicillins Anaphylaxis, Itching and Swelling    Darvon [Propoxyphene] Hives    Methocarbamol Other (See Comments)     Double vision    Reglan [Metoclopramide] Anxiety    Sulfa Antibiotics Hives, Itching, Rash and Swelling    Tetracyclines & Related Rash     Vitals:    09/11/18 0831   BP: 130/80   Pulse: 78   Resp: 18   Temp: 98 5 °F (36 9 °C)       Labs:  CMP  Lab Results   Component Value Date     07/14/2018    K 4 4 07/14/2018     07/14/2018    CO2 26 07/14/2018    ANIONGAP 4 11/04/2015    BUN 20 07/14/2018    CREATININE 0 84 07/14/2018    GLUCOSE 107 11/04/2015    GLUF 111 (H) 07/14/2018    CALCIUM 9 0 07/14/2018    AST 40 07/14/2018    ALT 73 07/14/2018    ALKPHOS 63 07/14/2018    PROT 7 0 11/04/2015    BILITOT 0 41 11/04/2015    EGFR 71 07/14/2018       BMP  Lab Results   Component Value Date    GLUCOSE 107 11/04/2015    CALCIUM 9 0 07/14/2018     07/14/2018    K 4 4 07/14/2018    CO2 26 07/14/2018     07/14/2018    BUN 20 07/14/2018    CREATININE 0 84 07/14/2018       Lipids  Lab Results   Component Value Date    CHOL 129 11/04/2015    CHOL 102 06/25/2015    CHOL 165 02/24/2015     Lab Results   Component Value Date    HDL 36 (L) 07/14/2018    HDL 42 02/03/2018    HDL 43 08/30/2017     Lab Results   Component Value Date    LDLCALC 35 07/14/2018    LDLCALC 60 02/03/2018    LDLCALC 50 08/30/2017     Lab Results   Component Value Date    TRIG 114 07/14/2018    TRIG 130 02/03/2018    TRIG 111 08/30/2017     No components found for: CHOLHDL    Hemoglobin A1C  Lab Results   Component Value Date    HGBA1C 6 2 07/14/2018       Fasting Glucose  Lab Results   Component Value Date    GLUF 111 (H) 07/14/2018       Insulin     Thyroid  No results found for: TSH, S2BRKJC, L1IMPIF, THYROIDAB    Hepatic Function Panel  Lab Results   Component Value Date    ALT 73 07/14/2018    AST 40 07/14/2018    ALKPHOS 63 07/14/2018    BILITOT 0 41 11/04/2015       Celiac Disease Antibody Panel  No results found for: ENDOMYSIAL IGA, GLIADIN IGA, GLIADIN IGG, IGA, TISSUE TRANSGLUT AB, TTG IGA   Iron  No results found for: IRON, TIBC, FERRITIN    Other Labs:    Final Diagnosis   Surgical Pathology Cancer Case Summary - Carcinoma of the Endometrium     - Procedure:  - Hysterectomy type:  Robotic laparoscopic total abdominal  hysterectomy with bilateral salpingo-oophorectomy  - Specimen integrity:  Specimen received intact  - Tumor site:  Anterior and posterior endometrium  - Tumor size:  3 3 x 1 7 cm    - Histologic type:  Endometrioid adenocarcinoma  - Histologic grade:  FIGO grade I  - Myometrial invasion:      - Depth of invasion:  2 mm     - Myometrial thickness:  12 mm     - Percentage of myometrial thickness:  17%  - Adenomyosis:  Present and partially involved by carcinoma  - Uterine serosa involvement:  None identified  - Lower uterine segment involvement:  No definite involvement identified  - Cervical stromal involvement:  None identified  - Other tissue/ organ involvement:  Bilateral ovaries, tubal remnants, and parametrial tissues are negative for malignancy  - Peritoneal/ascitic fluid:  Negative for malignancy ([id number])  - Margins:     - Ectocervical/vaginal cuff margin:  Negative for malignancy     - Parametrial/paracervical margin  Negative for malignancy  - Lymphovascular invasion:  None identified  - Regional lymph nodes:     - Pelvic node examination       - Number of pelvic lymph nodes with macrometastasis:  0       - Number of pelvic lymph nodes with micrometastasis:  0       - Number of pelvic lymph nodes with isolated tumor cells:  0       - Laterality of pelvic node(s) with tumor:  Not applicable       - Total number of pelvic lymph nodes examined (sentinel and non-sentinel):  6       - Number of pelvic sentinel nodes examined:  0       - Laterality of pelvic node(s) examined:  Bilateral     - Para-aortic lymph nodes       - No para-aortic nodes submitted or found  - Prognostic stage group (pTNM, AJCC 8th edition): IA -  pT1a, pN0, pMX, G1  - FIGO stage (2015 FIGO cancer report):  IA  - Additional pathologic findings:     - Intramural leiomyomata     - Left ovarian fibroma  - Ancillary studies:    - If needed for additional studies, tumor is best represented in block C18   - Clinical history:  Postmenopausal bleeding for approximately 1 month        Diagnoses for Individual Specimens     A  Right pelvic sentinel lymph node:     - Fibrovascular tissue; negative for malignancy      - No lymphoid tissue identified within this entirely submitted specimen      B  Left pelvic sentinel lymph node:     - Fibrovascular tissue; negative for malignancy      - No lymphoid tissue identified within this entirely submitted specimen      C  Uterus with bilateral ovaries and fallopian tubes:     - Uterus:       -- Endometrial adenocarcinoma; endometrioid type, FIGO grade I, with superficial myometrial invasion  -- Adenomyosis partially involved by adenocarcinoma       -- Intramural leiomyomata     - Cervix:  Benign endocervical polyp; negative for malignancy  - Left ovary:  Ovarian fibroma; surface epithelial inclusion glands; negative for malignancy  - Right ovary:  Surface epithelial inclusion glands; negative for malignancy  - Fallopian tubes, bilateral:  Status post ligation; tubal remnants negative for malignancy  - Bilateral parametrial tissues are negative for malignancy      D  Right pelvic lymph nodes:     - Three lymph nodes identified; all negative for malignancy (0/3)      E  Left pelvic lymph nodes:     - Three lymph nodes identified; all negative for malignancy (0/3)  Physical Exam   Constitutional: She is oriented to person, place, and time  No distress  Pulmonary/Chest: Effort normal and breath sounds normal  No respiratory distress  She has no wheezes  She has no rales  She exhibits no tenderness  Abdominal: Soft  Bowel sounds are normal  She exhibits no distension and no mass  There is no tenderness  There is no rebound and no guarding     Well healed incision(s)   Genitourinary:   Genitourinary Comments: The vaginal cuff is well healed  Surgical absence of the cervix, uterus, bilateral fallopian tubes and ovaries  Neurological: She is alert and oriented to person, place, and time  Skin: She is not diaphoretic  Assessment      Stage IA grade 1 endometrioid adenocarcinoma of the uterus currently without evidence of disease  Plan      Patient will return in 6 months for her surveillance visit  Андрей Ricks MD, PhD, [facility]  Attending Physician, [address] [place] [address] Statement Selected No

## 2021-06-24 NOTE — ED ADULT NURSE REASSESSMENT NOTE - NS ED NURSE REASSESS COMMENT FT1
pt resting comfortable in stretcher, in no acute distress. no change in status, pending bed placement.

## 2021-06-24 NOTE — ED ADULT NURSE NOTE - OBJECTIVE STATEMENT
65y male walked into ED complaining of right leg weakness and decrease in sensation. pt states at midnight last night he lost sensation on his right leg fell down and waited about an hour and half till he regained sensation and was able to ambulate back to his bed. pt states this morning he still "felt like something was off" and his right leg had very mild decrease in sensation so decided to come into the ED. pt denies chest pain, SOB, abd pain, LOC, headaches, trauma, bleeding, pain 65y male walked into ED complaining of right leg weakness and decrease in sensation. pt states at midnight last night he lost sensation on his right leg fell down and waited about an hour and half till he regained sensation and was able to ambulate back to his bed. pt states this morning he still "felt like something was off" and his right leg had very mild decrease in sensation so decided to come into the ED. pt denies chest pain, SOB, abd pain, LOC, blurred vision, headaches, trauma, bleeding, pain. pt is A&Ox4 breathing spontaneous and unlabored on RA, abd is soft nontender on palpation, skin is warm, dry, and appropriate for race, no visible bruising, open wounds, b/l +pulses 65y male walked into ED complaining of right leg weakness and decrease in sensation. pt states at midnight last night he lost sensation on his right leg fell down and waited about an hour and half till he regained sensation and was able to ambulate back to his bed. pt states this morning he still "felt like something was off" and his right leg had very mild decrease in sensation so decided to come into the ED. pt denies chest pain, SOB, abd pain, LOC, blurred vision, headaches, trauma, bleeding, pain. pt is A&Ox4 breathing spontaneous and unlabored on RA, abd is soft nontender on palpation, skin is warm, dry, and appropriate for race, no visible bruising, open wounds, b/l +pulses, pt able to ambulate with steady gait.

## 2021-06-24 NOTE — ED PROVIDER NOTE - NS ED ROS FT
General: no fever  Head: no headache  Eyes: no vision change  ENT: no nasal discharge/congestion  CV: no chest pain  Resp: no SOB, no cough  GI: no N/V/D, no abdominal pain  : no dysuria  MSK: no joint pain  Skin: no new rash  Neuro: no focal weakness, +right leg numbness

## 2021-06-24 NOTE — ED PROVIDER NOTE - ATTENDING CONTRIBUTION TO CARE
65 M w/ hx of CAD on effiant, HTN, presents to the Er w/ numbness in the R leg that started yesterday at midnight, he reports that he fell and was unable to stand up due to the numbness in his leg. he states that the leg has had improvement in symptoms. He has no fevers, no chills, no nausea no vomiting. Pt reports that he has no lightheadedness, no dizziness. Pt states that he sat on the ground for approx 1 hour and was unable to stand, he then was able to get himself up from standing and went to bed, this am he woke up and "didn't fell like himself" he has no cp no sob, no new neuro deficit, states that as the AM progressed he felt more "normal" he has no leg numbness or pain.  Made a code stroke in triage. Const: Well-nourished, Well-developed, appearing stated age Head: atraumatic, normocephalic  Eyes: no conjunctival injection and no scleral icterus,   ENMT: Atraumatic external nose and ears, Moist mucus membranes  Neck: Symmetric, trachea midline,  CVS: +S1/S2, dorsalis pedis/radial pulse 2+ bilaterally  RESP: Unlabored respiratory effort, Clear to auscultation bilaterally  GI: Nontender/Nondistended, soft abdomen  MSK:   Skin: Warm, Dry w/ no rashes  Neuro: GCS=15, CNs II-XII grossly intact, motor in all 4 extremities equal, Sensation grossly intact   Psych: Awake, Alert, & Orientedx3;  Appropriate mood and affect, cooperative  plan for labs, 65 M w/ hx of CAD on effiant, HTN, presents to the Er w/ numbness in the R leg that started yesterday at midnight, he reports that he fell and was unable to stand up due to the numbness in his leg. he states that the leg has had improvement in symptoms. He has no fevers, no chills, no nausea no vomiting. Pt reports that he has no lightheadedness, no dizziness. Pt states that he sat on the ground for approx 1 hour and was unable to stand, he then was able to get himself up from standing and went to bed, this am he woke up and "didn't fell like himself" he has no cp no sob, no new neuro deficit, states that as the AM progressed he felt more "normal" he has no leg numbness or pain.  Made a code stroke in triage. Const: Well-nourished, Well-developed, appearing stated age Head: atraumatic, normocephalic  Eyes: no conjunctival injection and no scleral icterus,   ENMT: Atraumatic external nose and ears, Moist mucus membranes  Neck: Symmetric, trachea midline,  CVS: +S1/S2, dorsalis pedis/radial pulse 2+ bilaterally  RESP: Unlabored respiratory effort, Clear to auscultation bilaterally  GI: Nontender/Nondistended, soft abdomen  MSK:   Skin: Warm, Dry w/ no rashes  Neuro: GCS=15, CNs II-XII grossly intact, motor in all 4 extremities equal, Sensation grossly intact   Psych: Awake, Alert, & Orientedx3;  Appropriate mood and affect, cooperative  plan for labs, made a code stroke for his symptoms, admitted to the stroke unit for further management and work up of his RLE numbness, not tpa candidate, unlikely vascular eitiology of symptoms, pt symptoms are now resolved on my evaluation

## 2021-06-24 NOTE — ED PROVIDER NOTE - PHYSICAL EXAMINATION
General: well-appearing man in no acute distress  Head: normocephalic, atraumatic  Eyes: left pupil 5mm, right pupil 3mm, round and briskly reactive bilaterally, EOMI  Mouth: moist mucous membranes, tongue and uvula midline  Neck: supple neck  CV: normal rate and rhythm, peripheral pulses 2+ bilateral UE and LE  Respiratory: clear to auscultation bilaterally  Abdomen: soft, nontender, nondistended  MSK: no joint deformities  Neuro: alert and oriented x3, CN III-XII intact, speech clear, no pronator drift, no dysmetria, strength 5/5 UE and LE bilaterally, sensation mildly decreased on right leg  Skin: no rash

## 2021-06-24 NOTE — ED PROVIDER NOTE - OBJECTIVE STATEMENT
66yo man PMH CAD s/p stent on effient qod presents with right lower leg numbness that started at 12am this morning causing him to fall and not be able to get up for about 1.5 hours. He started to have return of sensation afterwards and gradually had improvement. He states he has very mild paresthesia in right leg but no difficulty ambulating. No headache, no vision changes, no slurred speech, no focal weakness or numbness in arms or legs. No lower back pain, no incontinence or retention. No fevers, cough, n/v/d, abdominal pain, chest pain or SOB.

## 2021-06-24 NOTE — H&P ADULT - TIME BILLING
65-year-old right-handed gentleman first evaluated at [facility] on 6/25/2021 with right leg weakness and numbness.  History and exam as above, with minor changes.  ROS otherwise negative.  Exam.  Alert and attentive; right leg-very subtle drift; iliopsoas 4/5; gait not tested; remainder of neurologic exam was nonfocal.  CT head (6/24/2021) to my eye was unremarkable.  MRI brain (6/25/2021) to my eye showed a small, acute left MCA infarct involving the left posterior rolandic region (postcentral gyrus).  MRA neck and head (6/25/2021) to my eye showed hypoplastic P1 segments bilaterally with bilateral P-comm's.  Impression.  On 6/23/2021 he developed right leg weakness and numbness, causing gait difficulty and he fell.  His symptoms were due to a small left MCA infarct involving the postcentral gyrus, mechanism unknown, consistent with embolic stroke of undetermined source.  Plan.  Follow-up TTE; ILR; continue aspirin and Effient for now as required for cardiac stents; from neurovascular standpoint, would continue dual antiplatelets for 3 weeks, and then switch to aspirin alone; atorvastatin 80 mg daily, target LDL less than 70; PT/OT; from neurovascular standpoint, cleared for discharge.

## 2021-06-24 NOTE — ED PROVIDER NOTE - CLINICAL SUMMARY MEDICAL DECISION MAKING FREE TEXT BOX
66yo man presents with RLE numbness since 12am and code stroke was initiated. Pt denies any other symptoms and has no neurologic deficits on exam except asymmetric pupils. Will obtain CTH as pt as noted iodine/contrast allergy. Neurologist was at bedside to evaluate pt. Will obtain blood work but will also check for electrolyte derangements for change in sensation. Low suspicion for lumbar/spinal etiology with no other symptoms.

## 2021-06-24 NOTE — H&P ADULT - NSHPPHYSICALEXAM_GEN_ALL_CORE
Vital Signs Last 24 Hrs  T(C): 37.1 (24 Jun 2021 10:42), Max: 37.1 (24 Jun 2021 10:42)  T(F): 98.7 (24 Jun 2021 10:42), Max: 98.7 (24 Jun 2021 10:42)  HR: 66 (24 Jun 2021 10:42) (66 - 66)  BP: 190/90 (24 Jun 2021 10:42) (190/90 - 190/90)  BP(mean): --  RR: 20 (24 Jun 2021 10:42) (20 - 20)  SpO2: 97% (24 Jun 2021 10:42) (97% - 97%)    GENERAL EXAM:  Constitutional: awake and alert. NAD  HEENT: PERRLA, EOMI  Neck: Supple  Respiratory: Breath sounds are clear bilaterally  Cardiovascular: S1 and S2, regular / irregular rhythm  Gastrointestinal: soft, nontender  Extremities: no edema, no cyanosis  Vascular: no carotid bruits  Musculoskeletal: no joint swelling/tenderness, no abnormal movements  Skin: no rashes    NEUROLOGICAL EXAM:  MS: AAOX3 to verbal stimulation, Speech is fluent, There is no aphasia, nor dysarthria noted. Follows simple and complex commands. Extinction wnl.   CN: VFF, EOMI, Left pupil 3mm and reactive to light. Rt pupil 2 mm and reactive to light. There is no gaze preference noted.   V1-3 intact, no facial asymmetry, t/p midline  Motor: Strength: 5/5 in the UE and LE b/l.   There i  Tone: normal. Bulk: normal.     Plantar flex b/l.   Sensation: intact to LT/PP/Vibration/Position/Temperature 4x.   Coordination: intact 4x.   Gait:  Romberg negative, pull test negative; walks with narrow base, pivots in 2 steps.    NIHSS 0  ABCD2 score 6   mRS 0 Vital Signs Last 24 Hrs  T(C): 37.1 (24 Jun 2021 10:42), Max: 37.1 (24 Jun 2021 10:42)  T(F): 98.7 (24 Jun 2021 10:42), Max: 98.7 (24 Jun 2021 10:42)  HR: 66 (24 Jun 2021 10:42) (66 - 66)  BP: 190/90 (24 Jun 2021 10:42) (190/90 - 190/90)  BP(mean): --  RR: 20 (24 Jun 2021 10:42) (20 - 20)  SpO2: 97% (24 Jun 2021 10:42) (97% - 97%)    GENERAL EXAM:  Constitutional: awake and alert. NAD  HEENT: Left pupil 3mm and Rt pupil 2mm, reactive to light b/l., EOMI      NEUROLOGICAL EXAM:  MS: AAOX3 to verbal stimulation, Speech is fluent, There is no aphasia, nor dysarthria noted. Follows simple and complex commands. Extinction wnl.   CN: VFF, EOMI, Left pupil 3mm and reactive to light. Rt pupil 2 mm and reactive to light. There is no gaze preference noted.   V1-3 intact, no facial asymmetry, t/p midline  Motor: Strength: 5/5 in the UE and LE b/l.   There is no pronation drift   Tone: normal. Bulk: normal.     Plantar flex b/l.   Sensation: intact to LT/Temperature 4x.   Coordination: intact 4x.   Gait:  Romberg negative, pull test negative; walks with narrow base, pivots in 2 steps.    NIHSS 0  ABCD2 score 6   mRS 0

## 2021-06-24 NOTE — H&P ADULT - NSHPLABSRESULTS_GEN_ALL_CORE
Labs:   cbc                      16.9   6.49  )-----------( 283      ( 24 Jun 2021 11:03 )             48.5     [id number]    136  |  103  |  25<H>  ----------------------------<  91  5.5<H>   |  19<L>  |  0.97    Ca    9.3      24 Jun 2021 11:03    TPro  7.9  /  Alb  4.6  /  TBili  0.7  /  DBili  x   /  AST  39  /  ALT  33  /  AlkPhos  127<H>  06-24    CoagsPT/INR - ( 24 Jun 2021 11:03 )   PT: 11.8 sec;   INR: 0.98 ratio         PTT - ( 24 Jun 2021 11:03 )  PTT:28.4 sec  Lipids  A1C  CardiacMarkers    LFTsLIVER FUNCTIONS - ( 24 Jun 2021 11:03 )  Alb: 4.6 g/dL / Pro: 7.9 g/dL / ALK PHOS: 127 U/L / ALT: 33 U/L / AST: 39 U/L / GGT: x               Radiology:  CT head w/o contrast:   IMPRESSION:  No evidence of large vessel occlusion. Mild left gaze preference. Consider MR evaluation for possible FLAIR DWI mismatch and MRA as indicated    Dr. Jensen discussed these findings with Dr. Felix on 6/24/2021 11:07 AM with read back.

## 2021-06-25 ENCOUNTER — TRANSCRIPTION ENCOUNTER (OUTPATIENT)
Age: 66
End: 2021-06-25

## 2021-06-25 VITALS
RESPIRATION RATE: 18 BRPM | SYSTOLIC BLOOD PRESSURE: 157 MMHG | DIASTOLIC BLOOD PRESSURE: 92 MMHG | OXYGEN SATURATION: 97 % | HEART RATE: 94 BPM | TEMPERATURE: 98 F

## 2021-06-25 DIAGNOSIS — I25.10 ATHEROSCLEROTIC HEART DISEASE OF NATIVE CORONARY ARTERY WITHOUT ANGINA PECTORIS: ICD-10-CM

## 2021-06-25 DIAGNOSIS — I63.9 CEREBRAL INFARCTION, UNSPECIFIED: ICD-10-CM

## 2021-06-25 DIAGNOSIS — I10 ESSENTIAL (PRIMARY) HYPERTENSION: ICD-10-CM

## 2021-06-25 LAB
A1C WITH ESTIMATED AVERAGE GLUCOSE RESULT: 5.4 % — SIGNIFICANT CHANGE UP (ref 4–5.6)
CHOLEST SERPL-MCNC: 152 MG/DL — SIGNIFICANT CHANGE UP
COVID-19 SPIKE DOMAIN AB INTERP: POSITIVE
COVID-19 SPIKE DOMAIN ANTIBODY RESULT: 5.58 U/ML — HIGH
ESTIMATED AVERAGE GLUCOSE: 108 MG/DL — SIGNIFICANT CHANGE UP (ref 68–114)
HCV AB S/CO SERPL IA: 0.12 S/CO — SIGNIFICANT CHANGE UP (ref 0–0.99)
HCV AB SERPL-IMP: SIGNIFICANT CHANGE UP
HDLC SERPL-MCNC: 30 MG/DL — LOW
LIPID PNL WITH DIRECT LDL SERPL: 104 MG/DL — HIGH
NON HDL CHOLESTEROL: 122 MG/DL — SIGNIFICANT CHANGE UP
SARS-COV-2 IGG+IGM SERPL QL IA: 5.58 U/ML — HIGH
SARS-COV-2 IGG+IGM SERPL QL IA: POSITIVE
TRIGL SERPL-MCNC: 93 MG/DL — SIGNIFICANT CHANGE UP

## 2021-06-25 PROCEDURE — U0003: CPT

## 2021-06-25 PROCEDURE — 83036 HEMOGLOBIN GLYCOSYLATED A1C: CPT

## 2021-06-25 PROCEDURE — C1764: CPT

## 2021-06-25 PROCEDURE — 97165 OT EVAL LOW COMPLEX 30 MIN: CPT

## 2021-06-25 PROCEDURE — 82962 GLUCOSE BLOOD TEST: CPT

## 2021-06-25 PROCEDURE — 85610 PROTHROMBIN TIME: CPT

## 2021-06-25 PROCEDURE — 99255 IP/OBS CONSLTJ NEW/EST HI 80: CPT

## 2021-06-25 PROCEDURE — 70450 CT HEAD/BRAIN W/O DYE: CPT

## 2021-06-25 PROCEDURE — 80053 COMPREHEN METABOLIC PANEL: CPT

## 2021-06-25 PROCEDURE — 85027 COMPLETE CBC AUTOMATED: CPT

## 2021-06-25 PROCEDURE — 82803 BLOOD GASES ANY COMBINATION: CPT

## 2021-06-25 PROCEDURE — C8929: CPT

## 2021-06-25 PROCEDURE — 86769 SARS-COV-2 COVID-19 ANTIBODY: CPT

## 2021-06-25 PROCEDURE — 86140 C-REACTIVE PROTEIN: CPT

## 2021-06-25 PROCEDURE — 93306 TTE W/DOPPLER COMPLETE: CPT | Mod: 26

## 2021-06-25 PROCEDURE — 99285 EMERGENCY DEPT VISIT HI MDM: CPT | Mod: 25

## 2021-06-25 PROCEDURE — 85730 THROMBOPLASTIN TIME PARTIAL: CPT

## 2021-06-25 PROCEDURE — 70551 MRI BRAIN STEM W/O DYE: CPT

## 2021-06-25 PROCEDURE — 33285 INSJ SUBQ CAR RHYTHM MNTR: CPT

## 2021-06-25 PROCEDURE — 86803 HEPATITIS C AB TEST: CPT

## 2021-06-25 PROCEDURE — 85025 COMPLETE CBC W/AUTO DIFF WBC: CPT

## 2021-06-25 PROCEDURE — 80061 LIPID PANEL: CPT

## 2021-06-25 PROCEDURE — 70547 MR ANGIOGRAPHY NECK W/O DYE: CPT

## 2021-06-25 PROCEDURE — 85652 RBC SED RATE AUTOMATED: CPT

## 2021-06-25 PROCEDURE — 84484 ASSAY OF TROPONIN QUANT: CPT

## 2021-06-25 PROCEDURE — U0005: CPT

## 2021-06-25 PROCEDURE — 97161 PT EVAL LOW COMPLEX 20 MIN: CPT

## 2021-06-25 PROCEDURE — 70544 MR ANGIOGRAPHY HEAD W/O DYE: CPT

## 2021-06-25 PROCEDURE — 80048 BASIC METABOLIC PNL TOTAL CA: CPT

## 2021-06-25 RX ORDER — PRASUGREL 5 MG/1
1 TABLET, FILM COATED ORAL
Qty: 0 | Refills: 0 | DISCHARGE

## 2021-06-25 RX ORDER — ATORVASTATIN CALCIUM 80 MG/1
1 TABLET, FILM COATED ORAL
Qty: 0 | Refills: 0 | DISCHARGE
Start: 2021-06-25

## 2021-06-25 RX ORDER — METOPROLOL TARTRATE 50 MG
1 TABLET ORAL
Qty: 0 | Refills: 0 | DISCHARGE

## 2021-06-25 RX ORDER — METOPROLOL TARTRATE 50 MG
1 TABLET ORAL
Qty: 15 | Refills: 0
Start: 2021-06-25 | End: 2021-07-24

## 2021-06-25 RX ORDER — ATORVASTATIN CALCIUM 80 MG/1
1 TABLET, FILM COATED ORAL
Qty: 30 | Refills: 0
Start: 2021-06-25 | End: 2021-07-24

## 2021-06-25 RX ADMIN — Medication 81 MILLIGRAM(S): at 12:01

## 2021-06-25 NOTE — PROVIDER CONTACT NOTE (OTHER) - ASSESSMENT
Pt is alert, oriented, high blood pressure within parameters for permissive hypertension. no needs at this time but to have the diet changed.
Pt is alert but visibly anxious. Wants to know results because he is concerned about if he did have a stroke or if something else is wrong; concerned because he hasn't seen a provider since yesterday upon arrival to floor. endorsed knowing he was not taking medication appropriately.

## 2021-06-25 NOTE — OCCUPATIONAL THERAPY INITIAL EVALUATION ADULT - PATIENT/FAMILY/SIGNIFICANT OTHER GOALS STATEMENT, OT EVAL
CT: No evidence of large vessel occlusion. Mild left gaze preference. MRI: Questionable focal area of diffusion hyperintensity in the postcentral gyrus on the L with restricted diffusion suggested on the ADC map, Evidence of prior foci of infarct in the cerebellum on the L as well as in the R frontal subcortical region. MRA: Unremarkable MR angiographic evaluation of the cervical arteries and [place]. Diminutive posterior circulation with relative fetal origins of the PCAs from the internal carotid arteries

## 2021-06-25 NOTE — PROVIDER CONTACT NOTE (OTHER) - SITUATION
Pt and family concerned about not receiving results yet. Would like to speak with a provider when available.
Pt passed dysphagia, had pureed diet for dinner last night, requesting regular consistency breakfast

## 2021-06-25 NOTE — OCCUPATIONAL THERAPY INITIAL EVALUATION ADULT - LIVES WITH, PROFILE
lives with wife in private house with 3 steps to enter without rail, 1 flight inside with 1 rail, right hand dominant/spouse

## 2021-06-25 NOTE — PHYSICAL THERAPY INITIAL EVALUATION ADULT - PRECAUTIONS/LIMITATIONS, REHAB EVAL
Pt used side rails to help walk around the house due to a feeling of being off balance, but not spinning, which started around the same time the RLE weakness & numbness. Episode lasted for ~3hrs & when pt woke up this AM, he wanted to get it checked out. The numbness & weakness have resolved, but still off balance. Pt did not take Effient yesterday & only took ASA 81mg. NIHSS 0. Not a tPA candidate due to being out of timeframe. +contrast allergy thus could not get CTA. Pt used side rails to help walk around the house due to a feeling of being off balance, but not spinning, which started around the same time the RLE weakness & numbness. Episode lasted for ~3hrs & when pt woke up this AM, he wanted to get it checked out. The numbness & weakness have resolved, but still off balance. Pt did not take Effient yesterday & only took ASA 81mg. NIHSS 0. Not a tPA candidate due to being out of timeframe. +contrast allergy thus could not get CTA./fall precautions

## 2021-06-25 NOTE — CONSULT NOTE ADULT - SUBJECTIVE AND OBJECTIVE BOX
EP Attending    HPI:  Patient is a 64 yo Rt handed M with pmh of HTN and CAD with stents placement on ASA and Effient presents to [facility] for Rt LE weakness and numbness with feeling of being off balanced. Patient states LWK was around 2330. Patient went to dinner and had 1 glass of wine and arrived home around 2300 pm. Then at 2330pm patient  was changing his clothes when he fell on his Rt side. Patient did not have LOC and no headstrike. Patient states his rt leg felt numb and weak and could not get up. Patient sat down for an hour until he felt comfortable to walk. Patient got up and was using side rails to help walk around the house. Patient states he had a feeling of being off balance, however does not describe it as room spinning,  which started around the same time the RT LE weakness and numbness. Patient states the episode lasted for about 3 hrs and when patient woke up this AM, he wanted to get it checked out and felt off balance as well. Patients the numbness and weakness have resolved. Patient however feels he is not baseline. Patient takes ASA 81mg daily. Patient was on Plavix but was switched to EFfient a couple years ago, but is now being titrated off. Patient did not take Effient yesterday and only took ASA 81mg. Code stroke was called. Patient had NIHSS 0 and MRS 0. Patient was not a tPA candidate due to being out of timeframe. Patient has contrast allergy thus could not get CTA.     Feeling better today, walking well. No palpitations, dizziness, lightheadedness or syncope recently or in remote history.  Has a 'very busy' lifestyle, works hard and officiates sporting evetnts, and had no prior symptoms with falls or leg weakness/numbness.  He has been on dual antiplatelet therapy from prior PCI.  A 10 pt ROS is otherwise negative.  Initally called family for advice, 'asking for a friend', but called back the next day to say that it was himself with these symptoms, requesting to be taken to the hospital.  Initial NIHSS 0.  No invasive or tPA therapies offered.  Doing well with PT so far.  Telemetry reviewed, no in-hospital AFib.  We discussed the workup of stroke of unknown source and the utility of long term cardiac monitoring with patient, and his daughter at bedside. Agreeable to proceeding with ILR insertion today.      PAST MEDICAL & SURGICAL HISTORY:  Hypertension    CAD (coronary artery disease)    No significant past surgical history      MEDICATIONS  (STANDING):  aspirin  chewable 81 milliGRAM(s) Enteral Tube daily  atorvastatin 80 milliGRAM(s) Oral at bedtime  enoxaparin Injectable 40 milliGRAM(s) SubCutaneous daily    Allergies    iodine (Rash; Flushing; Hives)  IV Contrast (Other)  Intolerances    FAMILY HISTORY:    Non-contributary for premature coronary disease or sudden cardiac death    SOCIAL HISTORY:    [ x] Non-smoker  [ ] Smoker  [ ] Alcohol    PHYSICAL EXAM:  T(C): 36.7 (06-25-21 @ 12:39), Max: 36.8 (06-24-21 @ 16:18)  HR: 67 (06-25-21 @ 12:39) (56 - 67)  BP: 172/118 (06-25-21 @ 12:39) (157/99 - 184/70)  RR: 18 (06-25-21 @ 12:39) (18 - 20)  SpO2: 97% (06-25-21 @ 12:39) (96% - 97%)  Wt(kg): --    General: Well nourished, no acute distress, alert and oriented x 3  Head: normocephalic, no trauma  Neck: no JVD, no bruit, supple, not enlarged  CV: S1S2, no S3, regular rate, rhythm is SINUS, no murmurs.    Lungs: clear BL, no rales or wheezes  Abdomen: bowel sounds +, soft, nontender, nondistended  Extremities: no clubbing, cyanosis or edema  Neuro: Moves all 4 extremities, sensation intact x 4 extremities  Skin: warm and moist, normal turgor  Psych: Mood and affect are appropriate for circumstances  MSK: normal range of motion and strength x4 extremities.    TELEMETRY: NSR  ECG: NSR, normal intervals.  MRI: acute L postcentral gyrus infarct.  Remote cerebellar infarct.  Normal head and neck arteries.  	  LABS:	 	                          15.4   5.51  )-----------( 286      ( 24 Jun 2021 13:32 )             45.3     06-24    138  |  104  |  24<H>  ----------------------------<  89  4.3   |  21<L>  |  0.95    Ca    9.1      24 Jun 2021 13:32    TPro  7.9  /  Alb  4.6  /  TBili  0.7  /  DBili  x   /  AST  39  /  ALT  33  /  AlkPhos  127<H>  06-24    ASSESSMENT/PLAN: 	65y Male with acute and remote CVA in different parts of the brain.  Recovering from acute stroke, doing well with PT.  No embolic source identified thus far.  EP consulted for ILR insertion for long term surveillance for atrial fibrllation.  Will insert loop recorder today.  Recommend completing echocardiogram.  Outpatient followup w/ Dr Aguayo in 2 weeks for wound check and data monitoring.      Jeff Morris M.D.  Cardiac Electrophysiology    office [phone]  pager [phone]
CHIEF COMPLAINT:    HISTORY OF PRESENT ILLNESS:   64 yo Rt handed M with pmh of HTN and CAD s/p PCI on ASA and Effient presents to [facility] for Rt LE weakness and numbness with feeling of being off balanced. Patient states LWK was around 2330. Patient went to dinner and had 1 glass of wine and arrived home around 2300 pm. Then at 2330pm patient  was changing his clothes when he fell on his Rt side. Patient did not have LOC and no headstrike. Patient states his rt leg felt numb and weak and could not get up. Patient sat down for an hour until he felt comfortable to walk. Patient got up and was using side rails to help walk around the house. Patient states he had a feeling of being off balance, however does not describe it as room spinning,  which started around the same time the RT LE weakness and numbness. Patient states the episode lasted for about 3 hrs and when patient woke up this AM, he wanted to get it checked out and felt off balance as well. Patients the numbness and weakness have resolved. Patient however feels he is not baseline. Patient takes ASA 81mg daily. Patient was on Plavix but was switched to EFfient a couple years ago, but is now being titrated off. Patient did not take Effient yesterday and only took ASA 81mg. Code stroke was called. Patient had NIHSS 0 and MRS 0. Patient was not a tPA candidate due to being out of timeframe. Patient has contrast allergy thus could not get CTA.             PAST MEDICAL & SURGICAL HISTORY:  Hypertension    CAD (coronary artery disease)    No significant past surgical history            MEDICATIONS:  aspirin  chewable 81 milliGRAM(s) Enteral Tube daily  enoxaparin Injectable 40 milliGRAM(s) SubCutaneous daily            atorvastatin 80 milliGRAM(s) Oral at bedtime        FAMILY HISTORY:      SOCIAL HISTORY:    [ ] Non-smoker  [ ] Smoker  [ ] Alcohol    Allergies    iodine (Rash; Flushing; Hives)  IV Contrast (Other)    Intolerances    	    REVIEW OF SYSTEMS:  CONSTITUTIONAL: No fever, weight loss, or fatigue  EYES: No eye pain, visual disturbances, or discharge  ENMT:  No difficulty hearing, tinnitus, vertigo; No sinus or throat pain  NECK: No pain or stiffness  RESPIRATORY: No cough, wheezing, chills or hemoptysis; No Shortness of Breath  CARDIOVASCULAR: No chest pain, palpitations, passing out, dizziness, or leg swelling  GASTROINTESTINAL: No abdominal or epigastric pain. No nausea, vomiting, or hematemesis; No diarrhea or constipation. No melena or hematochezia.  GENITOURINARY: No dysuria, frequency, hematuria, or incontinence  NEUROLOGICAL: No headaches, memory loss, ++loss of strength, numbness, or tremors  SKIN: No itching, burning, rashes, or lesions   LYMPH Nodes: No enlarged glands  ENDOCRINE: No heat or cold intolerance; No hair loss  MUSCULOSKELETAL: No joint pain or swelling; No muscle, back, or extremity pain  PSYCHIATRIC: No depression, anxiety, mood swings, or difficulty sleeping  HEME/LYMPH: No easy bruising, or bleeding gums  ALLERY AND IMMUNOLOGIC: No hives or eczema	    [ ] All others negative	  [ ] Unable to obtain    PHYSICAL EXAM:  T(C): 36.4 (06-25-21 @ 08:21), Max: 36.8 (06-24-21 @ 16:18)  HR: 65 (06-25-21 @ 08:21) (56 - 65)  BP: 182/116 (06-25-21 @ 08:21) (157/99 - 184/70)  RR: 18 (06-25-21 @ 08:21) (18 - 20)  SpO2: 96% (06-25-21 @ 08:21) (96% - 97%)  Wt(kg): --  I&O's Summary      Appearance: Normal	  HEENT:   Normal oral mucosa, PERRL, EOMI	  Lymphatic: No lymphadenopathy  Cardiovascular: Normal S1 S2, No JVD, No murmurs, No edema  Respiratory: Lungs clear to auscultation	  Psychiatry: A & O x 3, Mood & affect appropriate  Gastrointestinal:  Soft, Non-tender, + BS	  Skin: No rashes, No ecchymoses, No cyanosis	  Neurologic: CN: VFF, EOMI, Left pupil 3mm and reactive to light. Rt pupil 2 mm and reactive to light. There is no gaze preference noted.   V1-3 intact, no facial asymmetry, t/p midline  Motor: Strength: 5/5 in the UE and LE b/l.   There is no pronation drift   Tone: normal. Bulk: normal.     Plantar flex b/l.   Sensation: intact to LT/Temperature 4x.   Coordination: intact 4x.   Gait:  Romberg negative, pull test negative; walks with narrow base, pivots in 2 steps.      Extremities: Normal range of motion, No clubbing, cyanosis or edema  Vascular: Peripheral pulses palpable 2+ bilaterally    TELEMETRY: 	SR     ECG:  	  RADIOLOGY: < from: CT Brain Stroke Protocol (06.24.21 @ 11:05) >    EXAM:  CT BRAIN STROKE PROTOCOL                            PROCEDURE DATE:  06/24/2021            INTERPRETATION:  INDICATIONS:  Right sided weakness now resolved, code stroke patient with a contrast allergy    TECHNIQUE:  Serial axial images were obtained from the skull base to the vertex without intravenous contrast. Sagittal and Coronal reformats were performed    COMPARISON EXAMINATION: None.    FINDINGS:  VENTRICLES AND SULCI:  Normal.  INTRA-AXIAL:  No mass, blood or abnormal attenuation is seen.  EXTRA-AXIAL:  No mass or collection is seen.  VISUALIZED SINUSES:  Clear.  VISUALIZED MASTOIDS:  Clear.  CALVARIUM:  Normal.  MISCELLANEOUS:  Mild left gaze preference questioned.    IMPRESSION:  No evidence of large vessel occlusion. Mild left gaze preference. Consider MR evaluation for possible FLAIR DWI mismatch and MRA as indicated    Dr. Narayan discussed these findings with Dr. Felix on 6/24/2021 11:07 AM with read back.        < end of copied text >  < from: MR Angio Neck No Cont (06.24.21 @ 23:29) >    EXAM:  MR ANGIO BRAIN                          EXAM:  MR ANGIO NECK                          EXAM:  MR BRAIN                            PROCEDURE DATE:  06/24/2021            INTERPRETATION:  HISTORY: Evaluate for stroke weakness and numbness rightsided    MRI OF THE BRAIN, MRA OF THE [place] OF [place], AND MRA OF THE CERVICAL ARTERIES:    TECHNIQUE:    The examination consists of:  1) Axial SPGR  2) Axial FSE T2-weighted  3) Axial FLAIR  4) Sagittal T1 FLAIR  5) Axial GRE and SWI  6) Axial diffusion-weighted  7) 3D time-of-flight MRA of the [place] of [place]  8) 2D time-of-flight MRA of the cervical arteries  9) 3D MRA of the carotid artery bifurcations    Study is compared with CT 6/24      FINDINGS:    In the postcentral gyrus region on the left at the level of the cranial vertex there is some question of diffusion hyperintensity raised with a region of restricted diffusion on the ADC map noted as well. Findings are suspicious for a recent infarct in this distribution (526-:27, 500:27). 2 small left cerebellar foci of prior infarct. Focus of signal hyperintensity on FLAIR in the right frontal subcortical region consistent with a small prior region of ischemia/infarct in the white matter. Ossification of the falx anteriorly is identified. Mild involutional changes given the patient's age  Subtle maxillary sinus mucosal disease    The MR angiographic evaluation of the [place] demonstrates the internal carotid arteries to be patent.  The anterior, middle, and posterior cerebral arteries are unremarkable.  The vertebrobasilar confluence is unremarkable.  The basilar artery is patent. The posterior circulation is diminutive with relative fetal origin of the PCAs from the internal carotid arteries bilaterally.    The MRangiographic evaluation of the cervical arteries demonstrates the internal carotid arteries and vertebral arteries to be patent.      IMPRESSION:    1. Questionable focal area of diffusion hyperintensity in the postcentral gyrus on the left with restricted diffusion suggested on the ADC map.  Evidence of prior foci of infarct in the cerebellum on the left as well as in the right frontal subcortical region    2. Unremarkable MR angiographic evaluation of the cervical arteries and [place].Diminutive posterior circulation with relative fetal origins of the PCAs from the internal carotid arteries                DARIUS NARAYAN MD; Attending Radiologist  This document has been electronically signed. Jun 25 2021 11:30AM    < end of copied text >    OTHER: 	  	  LABS:	 	    CARDIAC MARKERS:                                  15.4   5.51  )-----------( 286      ( 24 Jun 2021 13:32 )             45.3     06-24    138  |  104  |  24<H>  ----------------------------<  89  4.3   |  21<L>  |  0.95    Ca    9.1      24 Jun 2021 13:32    TPro  7.9  /  Alb  4.6  /  TBili  0.7  /  DBili  x   /  AST  39  /  ALT  33  /  AlkPhos  127<H>  06-24    proBNP:   Lipid Profile:   HgA1c:   TSH:

## 2021-06-25 NOTE — OCCUPATIONAL THERAPY INITIAL EVALUATION ADULT - STANDING BALANCE: STATIC
(coronary artery disease)         Priority: High    DM (diabetes mellitus)         Priority: High    Hypertension         Priority: High    Gait abnormality 06/15/2018    Closed fracture of distal end of left radius with routine healing 06/14/2018    Open right ankle fracture, sequela 06/14/2018    Ankle fracture, right, open type I or II, initial encounter 06/12/2018    Hyperkalemia 06/12/2018    Type 2 diabetes mellitus with diabetic polyneuropathy, with long-term current use of insulin ([facility] 06/05/2018    Iron deficiency anemia 06/17/2017    Primary osteoarthritis of left knee 06/16/2017    Chronic kidney disease (CKD) stage G3b/A2, moderately decreased glomerular filtration rate (GFR) between 30-44 mL/min/1.73 square meter and albuminuria creatinine ratio between  mg/g ([facility]) 11/21/2016    Thyroid nodule 08/15/2016    Personal history of diabetic foot ulcer 08/15/2016    B12 deficiency 08/15/2016    Cataract 07/11/2016    Bipolar disorder ([facility] 12/08/2014    Insomnia 11/14/2014    Anxiety      Hypothyroidism      GERD (gastroesophageal reflux disease)      Insomnia      Obesity      Polyarticular arthritis        Current Facility-Administered Medications          Current Outpatient Medications   Medication Sig Dispense Refill    HYDROcodone-acetaminophen (NORCO) 5-325 MG per tablet     0    sulfamethoxazole-trimethoprim (BACTRIM DS) 800-160 MG per tablet Take 1 tablet by mouth 2 times daily for 5 days 10 tablet 0    aspirin 81 MG tablet Take 81 mg by mouth daily        tiZANidine (ZANAFLEX) 4 MG tablet Take 1 tablet by mouth every 8 hours as needed (muscle spasms) 90 tablet 3    Insulin Pen Needle 31G X 8 MM MISC 1 each by Does not apply route 4 times daily 200 each 11    LYRICA 150 MG capsule TAKE 1 CAPSULE BY MOUTH TWICE DAILY 60 capsule 3    NOVOLOG FLEXPEN 100 UNIT/ML injection pen INJECT 20 UNITS SUB Q THREE TIMES DAILY WITH MEALS BASED UPON RATIO AND CORRECTIVE FACTOR. AVERAGE 60 UNITS DAILY. 18 pen 3    insulin glargine (LANTUS SOLOSTAR) 100 UNIT/ML injection pen Inject 22 Units into the skin nightly 5 pen 11    amitriptyline (ELAVIL) 100 MG tablet Take 2 tablets by mouth nightly 60 tablet 5    fluticasone (FLONASE) 50 MCG/ACT nasal spray 2 sprays by Nasal route daily 1 Bottle 11    DULoxetine (CYMBALTA) 60 MG extended release capsule Take 1 capsule by mouth 2 times daily (Patient taking differently: Take 60 mg by mouth daily ) 60 capsule 11    QUEtiapine (SEROQUEL) 200 MG tablet Take 1 tablet by mouth nightly 30 tablet 5    levothyroxine (SYNTHROID) 88 MCG tablet Take 1 tablet by mouth Daily 90 tablet 3    furosemide (LASIX) 40 MG tablet Take 40 mg by mouth daily         valsartan (DIOVAN) 80 MG tablet Take 1 tablet by mouth nightly 30 tablet 11    Blood Glucose Monitoring Suppl (FREESTYLE LITE) JEZ 1 Device by Does not apply route 4 times daily 1 Device 0    blood glucose test strips (FREESTYLE LITE) strip 1 each by In Vitro route 4 times daily As needed.  100 each 3    linaclotide (LINZESS) 290 MCG CAPS capsule Take 1 capsule by mouth every morning (before breakfast) (Patient taking differently: Take 290 mcg by mouth every other day ) 30 capsule 11    pantoprazole (PROTONIX) 40 MG tablet TAKE ONE TABLET BY MOUTH ONCE DAILY 90 tablet 3    atorvastatin (LIPITOR) 40 MG tablet TAKE ONE TABLET BY MOUTH ONCE DAILY (Patient taking differently: TAKE ONE TABLET BY MOUTH ONCE NIGHTLY) 90 tablet 3    metoprolol succinate (TOPROL XL) 50 MG extended release tablet Take 1 tablet by mouth daily 30 tablet 11      No current facility-administered medications for this visit.          Allergies: Dilaudid [hydromorphone hcl]  Past Medical History        Past Medical History:   Diagnosis Date    Anxiety      Arthritis      Bipolar 1 disorder (HCC)      CAD (coronary artery disease)      CHF (congestive heart failure) ([facility])      Chronic kidney disease (CKD) stage G3b/A2, moderately decreased glomerular filtration rate (GFR) between 30-44 mL/min/1.73 square meter and albuminuria creatinine ratio between  mg/g ([facility] 2016    Depression      DM (diabetes mellitus) (HCC)      GERD (gastroesophageal reflux disease)      History of blood transfusion      History of suicidal ideation      Hyperlipidemia      Hypertension      Hypothyroidism      Insomnia      Kidney disease       stage 3 failure    MI, old 2005    Obesity      Polyarticular arthritis      Type II or unspecified type diabetes mellitus without mention of complication, not stated as uncontrolled           Past Surgical History         Past Surgical History:   Procedure Laterality Date    ABDOMINOPLASTY        ANGIOPLASTY   05     stent placement to LAD and diagonal with normal left vent function    BREAST REDUCTION SURGERY        CARDIAC CATHETERIZATION   05, 05     see report  Stents x3    CARDIAC CATHETERIZATION   3/23/15  JDT     EF 50%    CARPAL TUNNEL RELEASE         SECTION         x2    CHOLECYSTECTOMY        GASTRIC BYPASS SURGERY        HERNIA REPAIR         umbilical with mesh    INTRACAPSULAR CATARACT EXTRACTION Left 2016     LT EYE CATARACT EMULSIFICATION IOL IMPLANT performed by Nnamdi Acosta MD at [address] [place] [address] Right 2018     ORIF RIGHT BIMALLEOLAR ANKLE FRACTURE, RIGHT WRIST CAST REMOVAL performed by Sara Ni MD at [address] [place] [address] Right 2018     ANKLE OPEN REDUCTION INTERNAL FIXATION performed by Sara Ni MD at [place] [address] Left 2017     KNEE TOTAL ARTHROPLASTY performed by Dakota Hill DO at [facility]  Gal Faye  Family History         Family History   Problem Relation Age of Onset    Depression Mother      Heart Attack Mother      High Blood Pressure Mother      Kidney Disease Father      Alcohol Abuse Father      Depression Father      Heart Attack Father      High Blood Pressure Father      Kidney Disease Brother      Heart Disease Other      Depression Sister           Social History            Tobacco Use    Smoking status: Never Smoker    Smokeless tobacco: Former User       Types: Snuff   Substance Use Topics    Alcohol use: No            Review of System:        Except as noted in HPI, cardiovascular and respiratory systems are otherwise negative.        Objective:     /82 (Site: Right Upper Arm, Position: Sitting, Cuff Size: Medium Adult)   Pulse 86   Ht 5' (1.524 m)   Wt 241 lb (109.3 kg)   BMI 47.07 kg/m²      GENERAL - well developed and well nourished    HEENT -  PERRLA, Hearing appears normal  NECK - no thyromegaly, no JVD, trachea is in the midline  CARDIOVASCULAR - PMI is in the mid line clavicular position, Normal S1 and S2 with no systolic murmur. No S3 or S4    PULMONARY - no respiratory distress. No wheezes or rales. Lungs are clear to ausculation   ABDOMEN  - soft, non tender, no rebound  MUSCULOSKELETAL  - range of motion of the upper and lower extermites appears normal and equal and is without pain   EXTREMITIES - no significant edema   NEUROLOGIC - gait and station are normal  SKIN - turgor is normal  PSYCHIATRIC - normal mood and affect, alert and orientated x 3,        ASSESSMENT:     ALL THE CARDIOLOGY PROBLEMS ARE LISTED ABOVE; HOWEVER, THE FOLLOWING SPECIFIC CARDIAC PROBLEMS / CONDITIONS WERE ADDRESSED AND TREATED DURING THE OFFICE VISIT TODAY:          Cardiac Specific Problem   Discussion and Plan             1. Unstable angina   Initial Encounter     EKG in the office today sinus rhythm with a heart rate of 86 bpm with ST T wave abnormalities. Given patient's complaints of chest pain and history of coronary artery disease with this being her anginal equivalent will schedule cardiac catheterization with Dr. Cloyde Spurling.  Patient was instructed to report to the ER should symptoms worsen before cardiac catheterization. Patient taking baby aspirin daily              2. CAD  Initial Encounter    Last cath 3/2015 Patent stent to LAD, Patent stent to Diagonal             3. Hypertension   Initial Encounter    Blood pressure in the office today 132/82    4. Hyperlipidemia - Followed by PCP, patient is on Lipitor  5. Diabetes - Followed by PCP  6. CKD      PLAN:           Orders Placed This Encounter   Procedures    EKG 12 lead       Order Specific Question:   Reason for Exam?       Answer: Other        Encounter Medications    No orders of the defined types were placed in this encounter.         Return in about 1 month (around 5/1/2019) for post cath with me .     Discussed with patient.     I greatly appreciate the opportunity to meet Spencer Riggs and your confidence in allowing me to participate in her cardiovascular care.        TRESSA Monsalve - NP 4/3/2019 11:01 AM                                                   Date of the Proposed Procedure:  4/12/2019     Proposed Procedure:  Selective left heart and coronary arteriography with possible percutaneous coronary interventon, (femoral approach), 04/12/19      :  KINZA Martínez MD    Indications:  4/3/2019  Office visit \"Given patient's complaints of chest pain and history of coronary artery disease with this being her anginal equivalent. Tony Myles \" TRESSA Pereyra), AUC indication 55, AUC score 8    American Society of Anesthesiologists (ASA) Classification:  III    Plan of Sedation:  Moderate Sedation    Mallampati Classification:  II    I have examined this patient on 04/12/19  in [address] [place] [address] in the presence of [facility]. and find no interval changes since the original History and Physical / Consult as noted written by TRESSA Pereyra, on 4/3/2019    With the constellation of symptoms and these findings, I recommend cardiac catheterization and possibility of percutaneous coronary intervention. I discussed with her  in detail  the risks, benefits and alternatives to this procedure. The risks mentioned to her include but are not limited to:  vascular complications in ~ 3%, stroke <1%, renal dysfunction <5%, myocardial infarction <1%, coronary dissection <1%, need for emergency open heart surgery <1, and death <1% . She appeared to understand, had no questions, and agreed to proceed with this plan. Additionally, there are risks associated with moderate sedation which includes transient drop in blood pressure and need for assisted ventilation. This procedure is scheduled for 04/12/19 .     Flash Looney MD fair plus

## 2021-06-25 NOTE — PHYSICAL THERAPY INITIAL EVALUATION ADULT - LIVES WITH, PROFILE
spouse lives with wife in private house with 3 steps to enter without rail, 1 flight inside with 1 rail, right hand dominant/spouse

## 2021-06-25 NOTE — PROVIDER CONTACT NOTE (OTHER) - ACTION/TREATMENT ORDERED:
Team will review MRI; coming to see him after presentation at approximately 1200
Regular diet to be ordered.

## 2021-06-25 NOTE — OCCUPATIONAL THERAPY INITIAL EVALUATION ADULT - PRECAUTIONS/LIMITATIONS, REHAB EVAL
Pt used side rails to help walk around the house due to a feeling of being off balance, but not spinning, which started around the same time the RLE weakness & numbness. Episode lasted for ~3hrs & when pt woke up this AM, he wanted to get it checked out. The numbness & weakness have resolved, but still off balance. Pt did not take Effient yesterday & only took ASA 81mg. NIHSS 0. Not a tPA candidate due to being out of timeframe. +contrast allergy thus could not get CTA.; fall precautions/no known precautions/limitations

## 2021-06-25 NOTE — OCCUPATIONAL THERAPY INITIAL EVALUATION ADULT - ADDITIONAL COMMENTS
· Lives With: spouse; lives with wife in private house with 3 steps to enter without rail, 1 flight inside with 1 rail, right hand dominant  Pt independent throughout prior to admission

## 2021-06-25 NOTE — DISCHARGE NOTE PROVIDER - NSDCMRMEDTOKEN_GEN_ALL_CORE_FT
Aspirin Enteric Coated 81 mg oral delayed release tablet: 1 tab(s) orally once a day  atorvastatin 80 mg oral tablet: 1 tab(s) orally once a day (at bedtime)  CeleBREX 200 mg oral capsule: 1 cap(s) orally , As Needed  Effient 10 mg oral tablet: 1 tab(s) orally once a day for three weeks  Toprol-XL 25 mg oral tablet, extended release: 1 tab(s) orally every other day    Note:Last filled in feb for directions twice a day

## 2021-06-25 NOTE — DISCHARGE NOTE NURSING/CASE MANAGEMENT/SOCIAL WORK - PATIENT PORTAL LINK FT
You can access the FollowMyHealth Patient Portal offered by [facility] by registering at the following website: [email or web address]. By joining [facility]’s FollowMyHealth portal, you will also be able to view your health information using other applications (apps) compatible with our system.

## 2021-06-25 NOTE — DISCHARGE NOTE PROVIDER - PROVIDER TOKENS
PROVIDER:[TOKEN:[[id number]],FOLLOWUP:[2 weeks]],PROVIDER:[TOKEN:[[id number]],FOLLOWUP:[2 weeks]]

## 2021-06-25 NOTE — CHART NOTE - NSCHARTNOTEFT_GEN_A_CORE
EP Brief Operative Note    Diagnosis: AFib unspecified  Procedure: ILR insertion  Surgeon: Jeff Morris M.D.  Findings: Successful LINQ-1 insertion  EBL: minimal  Specimens: none  Post-op Diagnosis: AFib unspecified  Assistants: none    A/P)   65yoM with CAD, multiple prior PCI's, historically normal LVEF, HTN who presented with stroke symptoms.  MRI identified acute as well as chronic small infarcts.  ILR recommended for longterm surveillance for AFib.  s/p successful LINQ-1 implant.  2 week followup w Dr Aguayo for wound check and ILR management.    Jeff Morris M.D.  Cardiac Electrophysiology    office [phone]  pager [phone]

## 2021-06-25 NOTE — DISCHARGE NOTE PROVIDER - NSDCCPCAREPLAN_GEN_ALL_CORE_FT
PRINCIPAL DISCHARGE DIAGNOSIS  Diagnosis: Embolic stroke  Assessment and Plan of Treatment: Continue taking Aspirin 81mg qd po  Continue taking Lipitor 80mg qd po  Follow up with your Primary Care Physician within the next 1-2 weeks.   Follow up with your Neurologist within the next 1-2 weeks.  Bring a copy of your test results/discharge documents with you to your appointments.  Continue your medications as prescribed.  Contact your Neurologist, Primary Care Provider, or report to the Emergency Room if you experience new or worsening symptoms including sudden severe headache, new numbness/tingling, new weakness, and difficulty speaking.

## 2021-06-25 NOTE — OCCUPATIONAL THERAPY INITIAL EVALUATION ADULT - PERTINENT HX OF CURRENT PROBLEM, REHAB EVAL
PMHx: HTN, CAD with stents,on ASA & Effient. presents to [facility] for RLE weakness & numbness with feeling of being off balanced. LWK ~2330. Pt went to dinner, had 1 glass of wine, arrived home ~2300. At 2330 fell on R side while changing clothes. No LOC, no headstrike. Pt states RLE felt numb & weak, could not get up, sat down for an hr until he felt comfortable to walk. cont...

## 2021-06-25 NOTE — CONSULT NOTE ADULT - ASSESSMENT
66 yo male admitted with infarct in the cerebellum on the left as well as in the right frontal subcortical region

## 2021-06-25 NOTE — DISCHARGE NOTE PROVIDER - HOSPITAL COURSE
66 yo Rt handed M with pmh of HTN and CAD with stents placement on ASA and Effient presents to [facility] for Rt LE weakness and numbness with feeling of being off balanced. Patient states LWK was around 2330. Patient went to dinner and had 1 glass of wine and arrived home around 2300 pm. Then at 2330pm patient  was changing his clothes when he fell on his Rt side. Patient did not have LOC and no headstrike. Patient states his rt leg felt numb and weak and could not get up. Patient sat down for an hour until he felt comfortable to walk. Patient got up and was using side rails to help walk around the house. Patient states he had a feeling of being off balance, however does not describe it as room spinning,  which started around the same time the RT LE weakness and numbness. Patient states the episode lasted for about 3 hrs and when patient woke up this AM, he wanted to get it checked out and felt off balance as well. Patients the numbness and weakness have resolved. Patient however feels he is not baseline. Patient takes ASA 81mg daily. Patient was on Plavix but was switched to EFfient a couple years ago, but is now being titrated off. Patient did not take Effient yesterday and only took ASA 81mg. Code stroke was called. Patient had NIHSS 0 and MRS 0. Patient was not a tPA candidate due to being out of timeframe. Patient has contrast allergy thus could not get CTA.       CT Brain Stroke Protocol (06.24.21 @ 11:05)     FINDINGS:  VENTRICLES AND SULCI:  Normal.  INTRA-AXIAL:  No mass, blood or abnormal attenuation is seen.  EXTRA-AXIAL:  No mass or collection is seen.  VISUALIZED SINUSES:  Clear.  VISUALIZED MASTOIDS:  Clear.  CALVARIUM:  Normal.  MISCELLANEOUS:  Mild left gaze preference questioned.    IMPRESSION:  No evidence of large vessel occlusion. Mild left gaze preference. Consider MR evaluation for possible FLAIR DWI mismatch and MRA as indicated      MR Head No Cont (06.24.21 @ 23:05)     FINDINGS:    In the postcentral gyrus region on the left at the level of the cranial vertex there is some question of diffusion hyperintensity raised with a region of restricted diffusion on the ADC map noted as well. Findings are suspicious for a recent infarct in this distribution (526-:27, 500:27). 2 small left cerebellar foci of prior infarct. Focus of signal hyperintensity on FLAIR in the right frontal subcortical region consistent with a small prior region of ischemia/infarct in the white matter. Ossification of the falx anteriorly is identified. Mild involutional changes given the patient's age  Subtle maxillary sinus mucosal disease    The MR angiographic evaluation of the [place] of [place] demonstrates the internal carotid arteries to be patent.  The anterior, middle, and posterior cerebral arteries are unremarkable.  The vertebrobasilar confluence is unremarkable.  The basilar artery is patent. The posterior circulation is diminutive with relative fetal origin of the PCAs from the internal carotid arteries bilaterally.    The MRangiographic evaluation of the cervical arteries demonstrates the internal carotid arteries and vertebral arteries to be patent.      IMPRESSION:    1. Questionable focal area of diffusion hyperintensity in the postcentral gyrus on the left with restricted diffusion suggested on the ADC map.  Evidence of prior foci of infarct in the cerebellum on the left as well as in the right frontal subcortical region    2. Unremarkable MR angiographic evaluation of the cervical arteries and [place]. 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 Patient instructed to continue with Aspirin 81mg qd po. Tt was noted that patient was taking Effient 10mg every other day in addition to Aspirin 81mg qd po however upon discharge patient is to take Aspirin 81mg qd po and Effient 10mg qd po for the next 3 weeks to be followed with Aspirin 81mg qd po indefinitely. Patient had an ILR placed and is expected to follow with Dr. Aguayo in the outpatient setting. Patient is to follow up with Dr. Libman in the outpatient setting within 2 weeks of discharge. Physical therapy noting patient did not possess any skilled PT needs. Patient was deemed stable for discharge on the afternoon of 6/25/21 to home.        66 yo Rt handed M with pmh of HTN and CAD with stents placement on ASA and Effient presents to [facility] for Rt LE weakness and numbness with feeling of being off balanced. Patient states LWK was around 2330. Patient went to dinner and had 1 glass of wine and arrived home around 2300 pm. Then at 2330pm patient  was changing his clothes when he fell on his Rt side. Patient did not have LOC and no headstrike. Patient states his rt leg felt numb and weak and could not get up. Patient sat down for an hour until he felt comfortable to walk. Patient got up and was using side rails to help walk around the house. Patient states he had a feeling of being off balance, however does not describe it as room spinning,  which started around the same time the RT LE weakness and numbness. Patient states the episode lasted for about 3 hrs and when patient woke up this AM, he wanted to get it checked out and felt off balance as well. Patients the numbness and weakness have resolved. Patient however feels he is not baseline. Patient takes ASA 81mg daily. Patient was on Plavix but was switched to EFfient a couple years ago, but is now being titrated off. Patient did not take Effient yesterday and only took ASA 81mg. Code stroke was called. Patient had NIHSS 0 and MRS 0. Patient was not a tPA candidate due to being out of timeframe. Patient has contrast allergy thus could not get CTA.       CT Brain Stroke Protocol (06.24.21 @ 11:05)     FINDINGS:  VENTRICLES AND SULCI:  Normal.  INTRA-AXIAL:  No mass, blood or abnormal attenuation is seen.  EXTRA-AXIAL:  No mass or collection is seen.  VISUALIZED SINUSES:  Clear.  VISUALIZED MASTOIDS:  Clear.  CALVARIUM:  Normal.  MISCELLANEOUS:  Mild left gaze preference questioned.    IMPRESSION:  No evidence of large vessel occlusion. Mild left gaze preference. Consider MR evaluation for possible FLAIR DWI mismatch and MRA as indicated      MR Head No Cont (06.24.21 @ 23:05)     FINDINGS:    In the postcentral gyrus region on the left at the level of the cranial vertex there is some question of diffusion hyperintensity raised with a region of restricted diffusion on the ADC map noted as well. Findings are suspicious for a recent infarct in this distribution (526-:27, 500:27). 2 small left cerebellar foci of prior infarct. Focus of signal hyperintensity on FLAIR in the right frontal subcortical region consistent with a small prior region of ischemia/infarct in the white matter. Ossification of the falx anteriorly is identified. Mild involutional changes given the patient's age  Subtle maxillary sinus mucosal disease    The MR angiographic evaluation of the [place] of [place] demonstrates the internal carotid arteries to be patent.  The anterior, middle, and posterior cerebral arteries are unremarkable.  The vertebrobasilar confluence is unremarkable.  The basilar artery is patent. The posterior circulation is diminutive with relative fetal origin of the PCAs from the internal carotid arteries bilaterally.    The MRangiographic evaluation of the cervical arteries demonstrates the internal carotid arteries and vertebral arteries to be patent.      IMPRESSION:    1. Questionable focal area of diffusion hyperintensity in the postcentral gyrus on the left with restricted diffusion suggested on the ADC map.  Evidence of prior foci of infarct in the cerebellum on the left as well as in the right frontal subcortical region    2. Unremarkable MR angiographic evaluation of the cervical arteries and [place] of [place]. Diminutive posterior circulation with relative fetal origins of the PCAs from the internal carotid arteries      TTE with Doppler (w/Cont) (06.25.21 @ 10:18)     Conclusions:  1. Mild segmental left ventricular systolic dysfunction.  The basal inferior, basal inferoseptal, and basal  inferolateral walls are severely hypokinetic .  Endocardial  visualization enhanced with intravenous injection of  Ultrasonic Enhancing Agent (Definity).  2. Mild diastolic dysfunction (Stage I).  3. The right ventricle is not well visualized. Right  ventricular enlargement with normal right ventricular  systolic function.        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 Patient instructed to continue with Aspirin 81mg qd po. Tt was noted that patient was taking Effient 10mg every other day in addition to Aspirin 81mg qd po however upon discharge patient is to take Aspirin 81mg qd po and Effient 10mg qd po for the next 3 weeks to be followed with Aspirin 81mg qd po indefinitely. Patient had an ILR placed and is expected to follow with Dr. Aguayo in the outpatient setting. Patient is to follow up with Dr. Libman in the outpatient setting within 2 weeks of discharge. Physical therapy noting patient did not possess any skilled PT needs. Patient was deemed stable for discharge on the afternoon of 6/25/21 to home.        66 yo Rt handed M with pmh of HTN and CAD with stents placement on ASA and Effient presents to [facility] for Rt LE weakness and numbness with feeling of being off balanced. Patient states LWK was around 2330. Patient went to dinner and had 1 glass of wine and arrived home around 2300 pm. Then at 2330pm patient  was changing his clothes when he fell on his Rt side. Patient did not have LOC and no headstrike. Patient states his rt leg felt numb and weak and could not get up. Patient sat down for an hour until he felt comfortable to walk. Patient got up and was using side rails to help walk around the house. Patient states he had a feeling of being off balance, however does not describe it as room spinning,  which started around the same time the RT LE weakness and numbness. Patient states the episode lasted for about 3 hrs and when patient woke up this AM, he wanted to get it checked out and felt off balance as well. Patients the numbness and weakness have resolved. Patient however feels he is not baseline. Patient takes ASA 81mg daily. Patient was on Plavix but was switched to EFfient a couple years ago, but is now being titrated off. Patient did not take Effient yesterday and only took ASA 81mg. Code stroke was called. Patient had NIHSS 0 and MRS 0. Patient was not a tPA candidate due to being out of timeframe. Patient has contrast allergy thus could not get CTA.       CT Brain Stroke Protocol (06.24.21 @ 11:05)     FINDINGS:  VENTRICLES AND SULCI:  Normal.  INTRA-AXIAL:  No mass, blood or abnormal attenuation is seen.  EXTRA-AXIAL:  No mass or collection is seen.  VISUALIZED SINUSES:  Clear.  VISUALIZED MASTOIDS:  Clear.  CALVARIUM:  Normal.  MISCELLANEOUS:  Mild left gaze preference questioned.    IMPRESSION:  No evidence of large vessel occlusion. Mild left gaze preference. Consider MR evaluation for possible FLAIR DWI mismatch and MRA as indicated      MR Head No Cont (06.24.21 @ 23:05)     FINDINGS:    In the postcentral gyrus region on the left at the level of the cranial vertex there is some question of diffusion hyperintensity raised with a region of restricted diffusion on the ADC map noted as well. Findings are suspicious for a recent infarct in this distribution (526-:27, 500:27). 2 small left cerebellar foci of prior infarct. Focus of signal hyperintensity on FLAIR in the right frontal subcortical region consistent with a small prior region of ischemia/infarct in the white matter. Ossification of the falx anteriorly is identified. Mild involutional changes given the patient's age  Subtle maxillary sinus mucosal disease    The MR angiographic evaluation of the [place] of [place] demonstrates the internal carotid arteries to be patent.  The anterior, middle, and posterior cerebral arteries are unremarkable.  The vertebrobasilar confluence is unremarkable.  The basilar artery is patent. The posterior circulation is diminutive with relative fetal origin of the PCAs from the internal carotid arteries bilaterally.    The MRangiographic evaluation of the cervical arteries demonstrates the internal carotid arteries and vertebral arteries to be patent.      IMPRESSION:    1. Questionable focal area of diffusion hyperintensity in the postcentral gyrus on the left with restricted diffusion suggested on the ADC map.  Evidence of prior foci of infarct in the cerebellum on the left as well as in the right frontal subcortical region    2. Unremarkable MR angiographic evaluation of the cervical arteries and [place] of [place]. Diminutive posterior circulation with relative fetal origins of the PCAs from the internal carotid arteries      TTE with Doppler (w/Cont) (06.25.21 @ 10:18)     Conclusions:  1. Mild segmental left ventricular systolic dysfunction.  The basal inferior, basal inferoseptal, and basal  inferolateral walls are severely hypokinetic .  Endocardial  visualization enhanced with intravenous injection of  Ultrasonic Enhancing Agent (Definity).  2. Mild diastolic dysfunction (Stage I).  3. The right ventricle is not well visualized. Right  ventricular enlargement with normal right ventricular  systolic function.        Impression:     Acute RT LE sided sensory-motor deficit now improving  2/2 to Left postcentral gyrus infarction perhaps BO rather than MCA distribution) due to ESUS      Patient was improved significantly the following day. Patient instructed to continue with Aspirin 81mg qd po. Tt was noted that patient was taking Effient 10mg every other day in addition to Aspirin 81mg qd po however upon discharge patient is to take Aspirin 81mg qd po and Effient 10mg qd po for the next 3 weeks to be followed with Aspirin 81mg qd po indefinitely. Patient had an ILR placed and is expected to follow with Dr. Aguayo in the outpatient setting. Patient is to follow up with Dr. Libman in the outpatient setting within 2 weeks of discharge. Physical therapy noting patient did not possess any skilled PT needs. Patient was deemed stable for discharge on the afternoon of 6/25/21 to home.

## 2021-06-25 NOTE — PROVIDER CONTACT NOTE (OTHER) - BACKGROUND
p/w brief loss of strength and sensation in R leg. CT negative at this time; stroke work up to be completed. Awaiting MRI results from completed MRA from 10pm last night, 6/24.
Presented to ED with decreased strength and sensation in R leg. CT negative and awaiting MRI results. None in the computer at this time.

## 2021-06-25 NOTE — PHYSICAL THERAPY INITIAL EVALUATION ADULT - PERTINENT HX OF CURRENT PROBLEM, REHAB EVAL
PMHx: HTN, CAD with stents,on ASA & Effient. presents to [facility] for RLE weakness & numbness with feeling of being off balanced. LWK ~2330. Pt went to dinner, had 1 glass of wine, arrived home ~2300. At 2330 fell on R side while changing clothes. No LOC, no headstrike. Pt states RLE felt numb & weak, could not get up, sat down for an hr until he felt comfortable to walk. cont...

## 2021-06-25 NOTE — PHYSICAL THERAPY INITIAL EVALUATION ADULT - ADDITIONAL COMMENTS
CT: No evidence of large vessel occlusion. Mild left gaze preference. MRI: Questionable focal area of diffusion hyperintensity in the postcentral gyrus on the L with restricted diffusion suggested on the ADC map, Evidence of prior foci of infarct in the cerebellum on the L as well as in the R frontal subcortical region. MRA: Unremarkable MR angiographic evaluation of the cervical arteries and [place] of [place]. Diminutive posterior circulation with relative fetal origins of the PCAs from the internal carotid arteries

## 2021-06-25 NOTE — CONSULT NOTE ADULT - REASON FOR ADMISSION
Rt Lower extremity weakness with numbness and feeling of off balance.
Rt Lower extremity weakness with numbness and feeling of off balance.

## 2021-06-25 NOTE — DISCHARGE NOTE PROVIDER - CARE PROVIDER_API CALL
Seb Aguayo ()  Cardiology; Internal Medicine  [address], [address]  [place] [address]  Phone: [phone]  Fax: [phone]  Follow Up Time: 2 weeks    Libman, Richard B (MD)  Neurology; Vascular Neurology  [address]  [place] [address]  Phone: [phone]  Fax: [phone]  Follow Up Time: 2 weeks

## 2021-06-25 NOTE — PHYSICAL THERAPY INITIAL EVALUATION ADULT - STAIR PATTERN, REHAB EVAL
x9 steps, then negotiated 4 steps without rail with step over step pattern independently/step over step

## 2021-07-02 PROBLEM — I25.10 ATHEROSCLEROTIC HEART DISEASE OF NATIVE CORONARY ARTERY WITHOUT ANGINA PECTORIS: Chronic | Status: ACTIVE | Noted: 2021-06-24

## 2021-07-26 ENCOUNTER — APPOINTMENT (OUTPATIENT)
Dept: INTERNAL MEDICINE | Facility: CLINIC | Age: 66
End: 2021-07-26
Payer: COMMERCIAL

## 2021-07-26 VITALS
BODY MASS INDEX: 29.39 KG/M2 | HEART RATE: 82 BPM | HEIGHT: 72 IN | OXYGEN SATURATION: 96 % | SYSTOLIC BLOOD PRESSURE: 150 MMHG | TEMPERATURE: 97.6 F | WEIGHT: 217 LBS | DIASTOLIC BLOOD PRESSURE: 92 MMHG

## 2021-07-26 VITALS — DIASTOLIC BLOOD PRESSURE: 92 MMHG | SYSTOLIC BLOOD PRESSURE: 143 MMHG

## 2021-07-26 DIAGNOSIS — E78.00 PURE HYPERCHOLESTEROLEMIA, UNSPECIFIED: ICD-10-CM

## 2021-07-26 DIAGNOSIS — M25.561 PAIN IN RIGHT KNEE: ICD-10-CM

## 2021-07-26 DIAGNOSIS — G89.29 PAIN IN RIGHT KNEE: ICD-10-CM

## 2021-07-26 DIAGNOSIS — M25.562 PAIN IN RIGHT KNEE: ICD-10-CM

## 2021-07-26 DIAGNOSIS — Z86.73 PERSONAL HISTORY OF TRANSIENT ISCHEMIC ATTACK (TIA), AND CEREBRAL INFARCTION W/OUT RESIDUAL DEFICITS: ICD-10-CM

## 2021-07-26 PROCEDURE — 99072 ADDL SUPL MATRL&STAF TM PHE: CPT

## 2021-07-26 PROCEDURE — 99214 OFFICE O/P EST MOD 30 MIN: CPT

## 2021-07-26 NOTE — REVIEW OF SYSTEMS
[Negative] : Gastrointestinal [FreeTextEntry5] : as above [FreeTextEntry9] : as abobe [de-identified] : as above

## 2021-07-26 NOTE — HISTORY OF PRESENT ILLNESS
[FreeTextEntry1] : had  cva  in june    completely  recovered  possible emboliic had  event  monitor  implanted.  all his meds are the same.  ha s no  cp  n sb.  has more pain in his knees  from chronic   arthritis.h 3e  says he never was a heavy smoker  1 pack per week and less at times

## 2021-07-26 NOTE — PHYSICAL EXAM
[Normal Sclera/Conjunctiva] : normal sclera/conjunctiva [Normal Outer Ear/Nose] : the outer ears and nose were normal in appearance [No JVD] : no jugular venous distention [Supple] : supple [No Respiratory Distress] : no respiratory distress  [Clear to Auscultation] : lungs were clear to auscultation bilaterally [Normal Rate] : normal rate  [Normal S1, S2] : normal S1 and S2 [Regular Rhythm] : with a regular rhythm [No Carotid Bruits] : no carotid bruits [No Abdominal Bruit] : a ~M bruit was not heard ~T in the abdomen [No Varicosities] : no varicosities [No Edema] : there was no peripheral edema [Coordination Grossly Intact] : coordination grossly intact [No Focal Deficits] : no focal deficits [Normal Gait] : normal gait [Normal] : affect was normal and insight and judgment were intact

## 2021-08-17 ENCOUNTER — NON-APPOINTMENT (OUTPATIENT)
Age: 66
End: 2021-08-17

## 2021-08-17 ENCOUNTER — APPOINTMENT (OUTPATIENT)
Dept: NEUROLOGY | Facility: CLINIC | Age: 66
End: 2021-08-17

## 2021-08-18 ENCOUNTER — APPOINTMENT (OUTPATIENT)
Dept: INTERNAL MEDICINE | Facility: CLINIC | Age: 66
End: 2021-08-18

## 2021-08-19 ENCOUNTER — APPOINTMENT (OUTPATIENT)
Dept: INTERNAL MEDICINE | Facility: CLINIC | Age: 66
End: 2021-08-19
Payer: COMMERCIAL

## 2021-08-19 VITALS
HEIGHT: 72 IN | BODY MASS INDEX: 28.85 KG/M2 | HEART RATE: 81 BPM | OXYGEN SATURATION: 98 % | SYSTOLIC BLOOD PRESSURE: 137 MMHG | WEIGHT: 213 LBS | DIASTOLIC BLOOD PRESSURE: 90 MMHG | TEMPERATURE: 97.8 F

## 2021-08-19 PROCEDURE — 99213 OFFICE O/P EST LOW 20 MIN: CPT

## 2021-08-19 NOTE — PHYSICAL EXAM
[Normal Sclera/Conjunctiva] : normal sclera/conjunctiva [Normal Outer Ear/Nose] : the outer ears and nose were normal in appearance [No JVD] : no jugular venous distention [No Respiratory Distress] : no respiratory distress  [No Accessory Muscle Use] : no accessory muscle use [Clear to Auscultation] : lungs were clear to auscultation bilaterally [Normal Rate] : normal rate  [Regular Rhythm] : with a regular rhythm [Normal S1, S2] : normal S1 and S2 [Normal Gait] : normal gait [Coordination Grossly Intact] : coordination grossly intact [Normal] : affect was normal and insight and judgment were intact

## 2021-08-19 NOTE — HISTORY OF PRESENT ILLNESS
[FreeTextEntry1] : he follows up for bp that was high  the last time  he feels good  he says he is only rarely taking celebrex  he remains on asa and effient  denies any cv sx  he exercises

## 2021-09-11 ENCOUNTER — TRANSCRIPTION ENCOUNTER (OUTPATIENT)
Age: 66
End: 2021-09-11

## 2022-01-13 NOTE — ED ADULT NURSE NOTE - NSFALLRSKPSTHSTOCCUR_ED_ALL_ED
Dear Sandeep,     My name is SAÚL Painting, and I recently had the pleasure of evaluating information related to your medical condition on our [employer] digital platform. Based on the information available, I have determined that your request for care was unable to be safely and effectively completed. Due to this, it is my recommendation that you seek care at a local Urgent Care or Immediate Care Center for an expedited and thorough face-to-face evaluation and consideration for diagnostic testing.     Please note that the [employer] department that you submitted your request for care is equivalent to a virtual format of Urgent Care or Immediate Care level of care. If you did not intend to seek this particular level of care and potentially had a scheduled appointment with another provider, we recommend that you directly call the office of the provider you are attempting to meet with to rectify connection issues.     Your care is very important to us. Please do not delay in acting on the recommendations for your care listed above. If you feel you may be experiencing a medical emergency, contact 911 immediately. If you have any questions regarding your visit, you may contact us at [phone].     Thank You,     SAÚL Painting   
Single Mechanical/Accidental Fall

## 2022-05-31 ENCOUNTER — APPOINTMENT (OUTPATIENT)
Dept: ORTHOPEDIC SURGERY | Facility: CLINIC | Age: 67
End: 2022-05-31

## 2022-09-14 ENCOUNTER — RX RENEWAL (OUTPATIENT)
Age: 67
End: 2022-09-14

## 2022-10-26 ENCOUNTER — APPOINTMENT (OUTPATIENT)
Dept: ORTHOPEDIC SURGERY | Facility: CLINIC | Age: 67
End: 2022-10-26

## 2022-10-26 VITALS — HEIGHT: 72 IN | WEIGHT: 213 LBS | BODY MASS INDEX: 28.85 KG/M2

## 2022-10-26 PROCEDURE — 99213 OFFICE O/P EST LOW 20 MIN: CPT

## 2022-10-26 PROCEDURE — 73562 X-RAY EXAM OF KNEE 3: CPT | Mod: 50

## 2022-10-26 RX ORDER — CELECOXIB 200 MG/1
200 CAPSULE ORAL
Qty: 90 | Refills: 0 | Status: COMPLETED | COMMUNITY
Start: 2018-11-27 | End: 2022-10-26

## 2022-10-26 NOTE — HISTORY OF PRESENT ILLNESS
[Gradual] : gradual [5] : 5 [Dull/Aching] : dull/aching [Intermittent] : intermittent [Rest] : rest [Injection therapy] : injection therapy [Standing] : standing [Walking] : walking [Full time] : Work status: full time [de-identified] : The patient has continued pain in both knees.  Mild pain standing and walking.  Mild to moderate pain with stairs and movie sign.  Pain when running.  He did have some improvement after previous Gel One injections.  He takes Celebrex p.r.n. 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

## 2022-10-26 NOTE — DISCUSSION/SUMMARY
[de-identified] : The patient is not interested in knee replacements\par I discussed trying Durolane injections and gave him a pamphlet.  Risks benefits and the alternatives were discussed.  He would like to try this\par Ice p.r.n.\par Continue Celebrex 200 mg b.i.d. with food p.r.n.  He uses this sparingly.  He was cleared by Dr. Reaves to take this\par \par Impression:\par Severe osteoarthritis right knee\par Severe osteoarthritis left knee

## 2022-11-01 ENCOUNTER — NON-APPOINTMENT (OUTPATIENT)
Age: 67
End: 2022-11-01

## 2022-11-23 ENCOUNTER — APPOINTMENT (OUTPATIENT)
Dept: ORTHOPEDIC SURGERY | Facility: CLINIC | Age: 67
End: 2022-11-23
Payer: COMMERCIAL

## 2022-11-23 VITALS — WEIGHT: 213 LBS | HEIGHT: 72 IN | BODY MASS INDEX: 28.85 KG/M2

## 2022-11-23 PROCEDURE — 20611 DRAIN/INJ JOINT/BURSA W/US: CPT | Mod: 50

## 2022-11-23 RX ORDER — HYALURONATE SODIUM, STABILIZED 60 MG/3 ML
60 SYRINGE (ML) INTRAARTICULAR
Refills: 0 | Status: COMPLETED | OUTPATIENT
Start: 2022-11-23

## 2022-11-23 RX ORDER — CELECOXIB 200 MG/1
200 CAPSULE ORAL
Qty: 60 | Refills: 1 | Status: ACTIVE | COMMUNITY
Start: 2022-11-23 | End: 1900-01-01

## 2022-11-23 RX ADMIN — Medication MG/3ML: at 00:00

## 2022-11-28 ENCOUNTER — NON-APPOINTMENT (OUTPATIENT)
Age: 67
End: 2022-11-28

## 2022-11-29 ENCOUNTER — RX RENEWAL (OUTPATIENT)
Age: 67
End: 2022-11-29

## 2022-12-01 ENCOUNTER — NON-APPOINTMENT (OUTPATIENT)
Age: 67
End: 2022-12-01

## 2022-12-05 ENCOUNTER — APPOINTMENT (OUTPATIENT)
Dept: INTERNAL MEDICINE | Facility: CLINIC | Age: 67
End: 2022-12-05

## 2022-12-05 VITALS
DIASTOLIC BLOOD PRESSURE: 80 MMHG | TEMPERATURE: 97.6 F | HEART RATE: 78 BPM | SYSTOLIC BLOOD PRESSURE: 145 MMHG | BODY MASS INDEX: 29.12 KG/M2 | OXYGEN SATURATION: 97 % | HEIGHT: 72 IN | WEIGHT: 215 LBS

## 2022-12-05 DIAGNOSIS — R03.0 ELEVATED BLOOD-PRESSURE READING, W/OUT DIAGNOSIS OF HYPERTENSION: ICD-10-CM

## 2022-12-05 DIAGNOSIS — I25.10 ATHEROSCLEROTIC HEART DISEASE OF NATIVE CORONARY ARTERY W/OUT ANGINA PECTORIS: ICD-10-CM

## 2022-12-05 PROCEDURE — 99213 OFFICE O/P EST LOW 20 MIN: CPT

## 2022-12-05 RX ORDER — COLCHICINE 0.6 MG/1
0.6 TABLET ORAL
Qty: 10 | Refills: 0 | Status: COMPLETED | COMMUNITY
Start: 2022-05-25

## 2022-12-05 RX ORDER — PRASUGREL 10 MG/1
10 TABLET, FILM COATED ORAL
Qty: 30 | Refills: 5 | Status: ACTIVE | COMMUNITY
Start: 2017-12-26 | End: 1900-01-01

## 2022-12-05 NOTE — HISTORY OF PRESENT ILLNESS
[FreeTextEntry1] : follow med problems  sees cardiology  told to inc statin dose but hesitant due to muscle pains  he had had some cp b8ut  had tress test which was ok

## 2022-12-05 NOTE — PHYSICAL EXAM
[No Acute Distress] : no acute distress [Normal Sclera/Conjunctiva] : normal sclera/conjunctiva [Normal Outer Ear/Nose] : the outer ears and nose were normal in appearance [No JVD] : no jugular venous distention [No Respiratory Distress] : no respiratory distress  [Clear to Auscultation] : lungs were clear to auscultation bilaterally [Normal Rate] : normal rate  [Regular Rhythm] : with a regular rhythm [Normal S1, S2] : normal S1 and S2 [No Edema] : there was no peripheral edema [Soft] : abdomen soft [Non Tender] : non-tender [No HSM] : no HSM [Speech Grossly Normal] : speech grossly normal [Alert and Oriented x3] : oriented to person, place, and time [Normal Mood] : the mood was normal [Normal] : affect was normal and insight and judgment were intact [FreeTextEntry1] : no  masses prostate ok  guiac is neg

## 2022-12-16 ENCOUNTER — APPOINTMENT (OUTPATIENT)
Dept: INTERNAL MEDICINE | Facility: CLINIC | Age: 67
End: 2022-12-16

## 2022-12-16 DIAGNOSIS — J32.0 CHRONIC MAXILLARY SINUSITIS: ICD-10-CM

## 2022-12-16 DIAGNOSIS — R05.8 OTHER SPECIFIED COUGH: ICD-10-CM

## 2022-12-16 PROCEDURE — 99204 OFFICE O/P NEW MOD 45 MIN: CPT | Mod: 95

## 2022-12-16 NOTE — HISTORY OF PRESENT ILLNESS
[Home] : at home, [unfilled] , at the time of the visit. [Medical Office: ([facility])___] : at the medical office located in  [Verbal consent obtained from patient] : the patient, [unfilled] [FreeTextEntry8] : 67 year old male\par presents with a 3 days of URI symptoms including:\par Rhinitis, productive cough yellow sputum, sinus congestion and sinus headache, throat irritation\par + fever 102F - reports went to a public event but not sure any sick contacts \par + body aches\par +  earache\par Denies SOB or wheezing or CP or palpitations\par Denies NVD\par + covid vaccinated\par denies any recent travel\par denies any loss of taste or smell.\par 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 \par went to [facility] to test for covid and flu - reports did not get results\par \par

## 2022-12-16 NOTE — REVIEW OF SYSTEMS
[Fever] : fever [Chills] : no chills [Fatigue] : fatigue [Hot Flashes] : no hot flashes [Night Sweats] : no night sweats [Recent Change In Weight] : ~T no recent weight change [Earache] : earache [Hearing Loss] : no hearing loss [Nosebleeds] : no nosebleeds [Postnasal Drip] : postnasal drip [Nasal Discharge] : no nasal discharge [Sore Throat] : sore throat [Hoarseness] : no hoarseness [Cough] : cough [Negative] : Psychiatric [FreeTextEntry4] : sinus and nasal congestion, maxillary pressure and frontal pressure [FreeTextEntry6] : productive cough

## 2022-12-16 NOTE — PHYSICAL EXAM
[No Acute Distress] : no acute distress [Normal Voice/Communication] : normal voice/communication [de-identified] : pt able to communicate without any difficulty during tele visit.

## 2022-12-16 NOTE — ASSESSMENT
[FreeTextEntry1] : #Maxillary Sinusitis\par - wait for covid and flu results give 1-2 more days if no improvement and more mucus in cough then start abx\par - Augmentin bid for 7 days with food - pt denies any allergies to penicllin.\par - Flonase 1 puff each nostril qd \par - benzonatate prn for dry persistent cough\par - take medication with food\par - Tylenol for fever body aches\par - rest drink plenty of fluids\par - no improvement 3-4 days return to office/tele visit/urgent care\par \par pt agrees and understands plan via teach back method. all questions answered.\par \par

## 2022-12-16 NOTE — HEALTH RISK ASSESSMENT
[Never] : Never [Yes] : Yes [1 or 2 (0 pts)] : 1 or 2 (0 points) [Never (0 pts)] : Never (0 points) [No falls in past year] : Patient reported no falls in the past year [0] : 2) Feeling down, depressed, or hopeless: Not at all (0) [PHQ-2 Negative - No further assessment needed] : PHQ-2 Negative - No further assessment needed [PLB2Mtmcc] : 0

## 2022-12-19 ENCOUNTER — APPOINTMENT (OUTPATIENT)
Dept: UROLOGY | Facility: CLINIC | Age: 67
End: 2022-12-19

## 2023-01-15 ENCOUNTER — RX RENEWAL (OUTPATIENT)
Age: 68
End: 2023-01-15

## 2023-01-15 RX ORDER — METOPROLOL SUCCINATE 25 MG/1
25 TABLET, EXTENDED RELEASE ORAL
Qty: 180 | Refills: 0 | Status: ACTIVE | COMMUNITY
Start: 2023-01-15 | End: 1900-01-01

## 2023-03-29 ENCOUNTER — APPOINTMENT (OUTPATIENT)
Dept: ORTHOPEDIC SURGERY | Facility: CLINIC | Age: 68
End: 2023-03-29

## 2023-05-24 ENCOUNTER — APPOINTMENT (OUTPATIENT)
Dept: INTERNAL MEDICINE | Facility: CLINIC | Age: 68
End: 2023-05-24

## 2023-09-11 NOTE — ED PROVIDER NOTE - FACE
If any questions arise, call your provider.  If your provider is not available, please feel free to call the Surgical Center at [phone].    MEDICATIONS: Resume taking daily medication.  Take prescribed pain medication with food.  If no medication is prescribed, you may take non-aspirin pain medication if needed.  PAIN MEDICATION CAN BE VERY CONSTIPATING.  Take a stool softener or laxative such as senokot, pericolace, or milk of magnesia if needed.    Last pain medication given at Toradol (like ibuprofen) at 11:49 AM next dose can be given at 5:49 PM    What to Expect Post Anesthesia    Rest and take it easy for the first 24 hours.  A responsible adult is recommended to remain with you during that time.  It is normal to feel sleepy.  We encourage you to not do anything that requires balance, judgment or coordination.    FOR 24 HOURS DO NOT:  Drive, operate machinery or run household appliances.  Drink beer or alcoholic beverages.  Make important decisions or sign legal documents.    To avoid nausea, slowly advance diet as tolerated, avoiding spicy or greasy foods for the first day.  Add more substantial food to your diet according to your provider's instructions.  Babies can be fed formula or breast milk as soon as they are hungry.  INCREASE FLUIDS AND FIBER TO AVOID CONSTIPATION.    MILD FLU-LIKE SYMPTOMS ARE NORMAL.  YOU MAY EXPERIENCE GENERALIZED MUSCLE ACHES, THROAT IRRITATION, HEADACHE AND/OR SOME NAUSEA.           no lymph node enlargement

## 2023-10-13 NOTE — PATIENT PROFILE ADULT - NS PRO AD NO ADVANCE DIRECTIVE
Thank you for allowing us to care for you during your hospital stay!    As discussed, the EEG looked very stable.  There were only 2 epileptiform discharges noted in the entire study.  These are markers that tell us you still have a seizure tendency.  However, no epileptic seizures were recorded.      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  This type of spell does not require treatment with anti-seizure medication.  Please continue to work with your Psychiatrist (Dr. Zee Kwong) and counselor/therapist to work on coping strategies for stress/anxiety management.  Please call to schedule an appointment to see Dr. Kwong in the next week or two.    Please continue all medications as prescribed.      Follow-up as planned with Dr. Calero outpatient on October 30th at 230 pm in the [facility].      Take care and call the epilepsy office for any questions or concerns in the interim.    No

## 2023-11-30 ENCOUNTER — APPOINTMENT (OUTPATIENT)
Dept: ORTHOPEDIC SURGERY | Facility: CLINIC | Age: 68
End: 2023-11-30
Payer: COMMERCIAL

## 2023-11-30 VITALS — WEIGHT: 215 LBS | BODY MASS INDEX: 29.12 KG/M2 | HEIGHT: 72 IN

## 2023-11-30 DIAGNOSIS — M17.0 BILATERAL PRIMARY OSTEOARTHRITIS OF KNEE: ICD-10-CM

## 2023-11-30 PROCEDURE — 73562 X-RAY EXAM OF KNEE 3: CPT | Mod: 50

## 2023-11-30 PROCEDURE — 99213 OFFICE O/P EST LOW 20 MIN: CPT

## 2023-11-30 RX ORDER — BENZONATATE 200 MG/1
200 CAPSULE ORAL 3 TIMES DAILY
Qty: 30 | Refills: 0 | Status: COMPLETED | COMMUNITY
Start: 2022-12-16 | End: 2023-11-30

## 2023-11-30 RX ORDER — CELECOXIB 200 MG/1
200 CAPSULE ORAL
Qty: 30 | Refills: 5 | Status: COMPLETED | COMMUNITY
Start: 2021-07-26 | End: 2023-11-30

## 2023-11-30 RX ORDER — COLCHICINE 0.6 MG/1
0.6 CAPSULE ORAL
Qty: 20 | Refills: 0 | Status: COMPLETED | COMMUNITY
Start: 2022-05-24 | End: 2023-11-30

## 2023-11-30 RX ORDER — FLUTICASONE PROPIONATE 50 UG/1
50 SPRAY, METERED NASAL DAILY
Qty: 1 | Refills: 1 | Status: COMPLETED | COMMUNITY
Start: 2022-12-16 | End: 2023-11-30

## 2023-11-30 RX ORDER — AMOXICILLIN AND CLAVULANATE POTASSIUM 875; 125 MG/1; MG/1
875-125 TABLET, COATED ORAL
Qty: 14 | Refills: 0 | Status: COMPLETED | COMMUNITY
Start: 2022-12-16 | End: 2023-11-30

## 2023-12-05 NOTE — ED PROVIDER NOTE - HIV OFFER
NIKOLAI MENCHACA    Wife has verified her  takes levothyroxine 200 mcg daily    Lab apt made 12/6   Opt out

## 2024-02-15 NOTE — ED PROVIDER NOTE - NSTIMEPROVIDERCAREINITIATE_GEN_ER
Pt comes in with c/o \"pulled dialysis cath\". Pt was to start PD, but cath was pulled on day of insertion. Pt with c/o  itching & pain to insertion site. Scheduled for removal on 3/8 but instructed to come to ED if complications. HD yesterday.   06-Sep-2019 00:44

## 2024-03-06 NOTE — H&P ADULT - NSHPROSALLOTHERNEGRD_GEN_ALL_CORE
Outpatient Wound Clinician Visit Note    Chief Complaint:   Chief Complaint   Patient presents with    Wound     Abdominal open wound       Home Care Service:  Yes,   Home care agency name: [facility]  Date service started:   Agency phone:   Agency fax:   Agency contact name:    Type of Supervision: Patient seen by provider    Was care transitioned to this department today? Yes , [facility]    Was care transitioned from this department today?  Yes , [facility]    Hospitalization within the last 30 days (if yes, date of discharge)? No     Arrival Disposition: [place]  Transfer Assist: 1 person  Special Needs: Special Needs List: none    Comments:  Patient arrival information, vital signs and dressing removed by staff member noted.  Staff obtained consents, records, test results or processed orders.  Patient and Caregiver education was given regarding procedures/therapy planned.  Fall Risk screening performed.  Patient identified to be at a risk for a fall and extra precautionary measures have been taken to ensure this patient's safety.    Additional POCT Services Provided: None      Assessment:  Wound Abdomen Surgical Wound (Active)   Date First Assessed: 12/10/21   Present on Original Admission: Yes  Location: Abdomen  Level of Skin Injury: Full Thickness  Primary Wound Type: Surgical Wound      Assessments 3/6/2024 10:00 AM   Wound Image      Dressing Assessment Intact;Drainage present   Dressing Activity Changed   Dressing Changed On   03/06/24   Wound Exudate Moderate;Serosanguineous   Cleansing Agent Normal saline   Wound Bed/Tissue Type Granulated;Non-granulated;Red;Necrotic tissue, slough;Epithelialized   Wound Bed % Granulated 80 %   Wound Bed % Epithelialized 10 %   Wound Bed % Necrotic Tissue Slough 10 %   Periwound Condition Hyperpigmented;Erythema, blanchable   Wound Edge Attached to wound bed   Wound Status Unchanged   Topical Agent Barrier cream with zinc;Other (comment) (William Redding)   Wound Dressing Foam with  borders;Other (comment) (Enluxtra)   Wound Last Measured 03/06/24   Wound Length (cm) 1.6 cm   Wound Width (cm) 16 cm   Wound Depth (cm) 0.1 cm   Wound Surface Area (cm^2) 25.6 cm^2   Wound Volume (cm^3) 2.56 cm^3   PhotoTaken? Yes   Debridement Percentage (%) 85   Post-Procedure Length (cm) 1.6 cm   Post-Procedure Width (cm) 16 cm   Post-Procedure Depth (cm) 0.1 cm   Post-Procedure Surface Area (cm^2) 25.6 cm^2   Post-Procedure Volume (cm^3) 2.56 cm^3   Wound Volume Change (Initial) -2.04 cm3   Wound Volume % Change (Initial) -44.35 %   Wound Volume Change (30 days) 0.16 cm3   Wound Volume % Change (30 days) 6.67 %   Post-Procedure Volume Change (Initial) -2.04 cm3   Post-Procedure Volume % Change (Initial) -44.35 %   Post-Procedure Volume Change (30 days) 0.16 cm3   Post-Procedure Volume % Change (30 days) 6.67 %        Plan:  The below orders were released and performed during the visit:   Complete Wound Care  Every visit  Diagnosis: Surgical wound  Dressing change(s) to be done by: Wound Care Team  Dressing change(s) to be done by: Other (specify)  Other (specify): [facility] nurse and family  Dressing frequency: Daily  Wound location: Abdomen  Dressing change(s) to be done using: Clean Technique  Clean wound with: Normal saline  Clean wound with: Wound cleanser  Protect periwound with: Skin prep  Dressing type: Other (specify)  Dressing type: Gauze (multiple)  Other (specify): Silver Powder, Enluxtra (cut bigger size should be bigger when wound) ABD pad, Tape (patient preferrs Mepilex Border Foam over Enluxtra due to multiple skin tears)  Cover dressing: Other (specify)  Other (specify): to the skin tears apply Z-guard, Mepilex Border Foam  Specify order of application: Abdominal binder daily    Order Comments:  Apply lidocaine 2% gel to ulcer bed as needed for patient comfort or predebridement.     Interventions:    Wound Treatment and Goals (most recent)   Measurement of the wound had not changed but it improved as  evidenced by development of bridge of epithelial tissue.   Seen by doctor Cb, wound debrided with consent. Wound care treatment changed to add Silver poweder to the wound bed. Treatment applied as ordered and patient tolerated procedures well. Open supply given to the patient and visit note/order sent to [facility].     Clinical  Procedures performed this visit:  Excisional sharp debridement     Departure Instruction: Simple D/C (Rx, simple instructions) and Patient Process: Simple    Departure Disposition: Depart with assistance and Home self care or family care    Follow Up  Return in about 1 week (around 3/13/2024) for Follow up with Parth Cam.       All other review of systems negative, except as noted in HPI

## 2024-04-08 ENCOUNTER — APPOINTMENT (OUTPATIENT)
Dept: ORTHOPEDIC SURGERY | Facility: CLINIC | Age: 69
End: 2024-04-08
Payer: COMMERCIAL

## 2024-04-08 VITALS — HEIGHT: 72 IN | BODY MASS INDEX: 29.12 KG/M2 | WEIGHT: 215 LBS

## 2024-04-08 DIAGNOSIS — S39.012A STRAIN OF MUSCLE, FASCIA AND TENDON OF LOWER BACK, INITIAL ENCOUNTER: ICD-10-CM

## 2024-04-08 PROCEDURE — 73502 X-RAY EXAM HIP UNI 2-3 VIEWS: CPT

## 2024-04-08 PROCEDURE — 99214 OFFICE O/P EST MOD 30 MIN: CPT

## 2024-04-08 PROCEDURE — 72100 X-RAY EXAM L-S SPINE 2/3 VWS: CPT

## 2024-04-08 RX ORDER — METOPROLOL SUCCINATE 25 MG/1
25 TABLET, EXTENDED RELEASE ORAL
Qty: 180 | Refills: 0 | Status: COMPLETED | COMMUNITY
Start: 2018-03-12 | End: 2024-04-08

## 2024-04-08 RX ORDER — ATORVASTATIN CALCIUM 20 MG/1
20 TABLET, FILM COATED ORAL
Qty: 30 | Refills: 0 | Status: COMPLETED | COMMUNITY
Start: 2022-11-29 | End: 2024-04-08

## 2024-04-08 RX ORDER — METHYLPREDNISOLONE 4 MG/1
4 TABLET ORAL
Qty: 1 | Refills: 0 | Status: ACTIVE | COMMUNITY
Start: 2024-04-08 | End: 1900-01-01

## 2024-04-08 RX ORDER — LOSARTAN POTASSIUM 25 MG/1
25 TABLET, FILM COATED ORAL
Refills: 0 | Status: ACTIVE | COMMUNITY

## 2024-04-08 RX ORDER — CYCLOBENZAPRINE HYDROCHLORIDE 5 MG/1
5 TABLET, FILM COATED ORAL
Qty: 60 | Refills: 0 | Status: ACTIVE | COMMUNITY
Start: 2024-04-08 | End: 1900-01-01

## 2024-04-08 RX ORDER — EVOLOCUMAB 140 MG/ML
140 INJECTION, SOLUTION SUBCUTANEOUS
Refills: 0 | Status: ACTIVE | COMMUNITY

## 2024-04-08 NOTE — HISTORY OF PRESENT ILLNESS
[Lower back] : lower back [Right Leg] : right leg [Gradual] : gradual [10] : 10 [Radiating] : radiating [Sharp] : sharp [Tingling] : tingling [Constant] : constant [Household chores] : household chores [Leisure] : leisure [Sleep] : sleep [Nothing helps with pain getting better] : Nothing helps with pain getting better [Walking] : walking [Full time] : Work status: full time [de-identified] : Patient is a 68-year-old male with onset of moderate to severe right lower back pain rating down right leg over the past week.  No injury.  He has pain with change in position.  Pain when standing and walking.  Occasional numbness.  Pain awakens him from sleep at night.  No weakness.  No loss of bowel or bladder control.  Taking Celebrex which has not helped him.  Ambulating with a cane.  Seen today with his wife, Elissa [] : Post Surgical Visit: no [FreeTextEntry7] : R Leg [de-identified] : 6/2017 [de-identified] : Dr. Sal  [de-identified] :

## 2024-04-08 NOTE — IMAGING
[de-identified] : Right antalgic gait.  The patient ambulates with a cane  Lumbosacral spine Inspection-normal Tenderness-mild to moderate tenderness trapezius on the right with mild spasm Straight leg raising-positive for lower back pain at 60 degrees on the right Motor exam lower extremities-normal  Right hip Full painless passive range of motion. No tenderness  Left hip Full painless passive range of motion. No tenderness  Both legs No swelling Calves are soft and nontender Posterior tibial pulse 2+ bilaterally [FreeTextEntry1] : Reviewed and interpreted.  Lumbosacral spine AP and lateral views-severe multilevel disc space narrowing.  Anterior osteophytes [de-identified] : Reviewed and interpreted.  Pelvis AP with lateral right hip-mild degenerative changes of the right hip.  Moderate degenerative changes of the left hip

## 2024-04-08 NOTE — DISCUSSION/SUMMARY
[Medication Risks Reviewed] : Medication risks reviewed [de-identified] : Discontinue Celebrex He will try Medrol Dosepak.  He was instructed how to take this Cyclobenzaprine 5 to 10 mg every 8 hours as needed pain/spasm.  Not to take this if he is driving or needs to be alert Moist heat prn Tylenol prn Risk benefits and alternatives of prescribed medication(s) were discussed with the patient.  Side effects were discussed and questions were answered.  Discontinue medication if any issues.  To call me if any questions or concerns Continue ambulation with a cane Recommend he have pain management consult He will have MRI lumbosacral spine If for any reason he develops any significant neurologic changes, weakness lower extremities, loss of bowel bladder control, advised to go to emergency room immediately  Impression: Lumbosacral strain/spondylosis/radiculopathy

## 2024-04-12 ENCOUNTER — RESULT REVIEW (OUTPATIENT)
Age: 69
End: 2024-04-12

## 2024-04-14 ENCOUNTER — NON-APPOINTMENT (OUTPATIENT)
Age: 69
End: 2024-04-14

## 2024-04-17 ENCOUNTER — APPOINTMENT (OUTPATIENT)
Dept: ORTHOPEDIC SURGERY | Facility: CLINIC | Age: 69
End: 2024-04-17
Payer: COMMERCIAL

## 2024-04-17 VITALS — BODY MASS INDEX: 29.12 KG/M2 | WEIGHT: 215 LBS | HEIGHT: 72 IN

## 2024-04-17 DIAGNOSIS — M54.16 RADICULOPATHY, LUMBAR REGION: ICD-10-CM

## 2024-04-17 DIAGNOSIS — M47.817 SPONDYLOSIS W/OUT MYELOPATHY OR RADICULOPATHY, LUMBOSACRAL REGION: ICD-10-CM

## 2024-04-17 PROCEDURE — 99214 OFFICE O/P EST MOD 30 MIN: CPT

## 2024-04-17 NOTE — HISTORY OF PRESENT ILLNESS
[Lower back] : lower back [Right Leg] : right leg [Gradual] : gradual [10] : 10 [Radiating] : radiating [Sharp] : sharp [Tingling] : tingling [Constant] : constant [Household chores] : household chores [Leisure] : leisure [Sleep] : sleep [Nothing helps with pain getting better] : Nothing helps with pain getting better [Walking] : walking [Full time] : Work status: full time [de-identified] : 04/17/2024: 68-year-old male with onset of moderate to severe right lower back pain rating down right leg over the past few weeks.  No injury.  He has pain with change in position.  Pain when standing and walking.  Occasional numbness.  Pain awakens him from sleep at night.  No weakness.  No loss of bowel or bladder control.  Taking Celebrex which has not helped him.  Ambulating with a cane.  Seen today with his wife, Elissa.   Had severe pain for a few weeks  has had issues in past that usually went away in a few days.   Took MDP and and muscle relaxant.     Currently pain is now a 5 or 6.   [] : Post Surgical Visit: no [FreeTextEntry7] : R Leg [de-identified] : 6/2017 [de-identified] : Dr. Sal  [de-identified] :

## 2024-04-17 NOTE — IMAGING
[de-identified] : Spine: Inspection/Palpation: No tenderness to palpation throughout Cervical/thoracic/lumbar spine. No bony stepoffs, No lesions.  Gait: antalgic, able to perform bilateral toe and heel rise.  Able to perform tandem gait.    Neurologic:  Bilateral Lower Extremities 5/5 Iliopsoas/Quadriceps/Hamstrings/ Tibialis Anterior/ Gastrocnemius. Extensor Hallucis Longus/ Flexor Hallucis Longus except    Sensation intact to light touch L2-S1   MRI Lumbar spine reviewed and interpreted independently.  Agree with report as follows.  1. Moderate multilevel degenerative change without high-grade compression of the thecal sac.. 2. Grade 1 anterolisthesis L4-L5. Retrolisthesis at L1/L2, L2-L3 and L3-L4. 3. Multilevel neural foraminal and subarticular recess stenosis outlined above. 4. Sacralization of L5.

## 2024-04-17 NOTE — ASSESSMENT
[FreeTextEntry1] : 68 M with LBP and spasm. Pain paritall imrpoved with MDP  Has ri with spondylosithesis and sagar resendez Trial of PT  We will also provide a prescription for anti-inflammatories.  Discussed major side effects of medication including but not limited to gastritis and acute kidney injury.  He was instructed to take with food and to discontinue use if stomach or esophageal pain developed. If pain persists will send to pain management for IFEANYI

## 2024-05-01 RX ORDER — CELECOXIB 200 MG/1
200 CAPSULE ORAL
Qty: 30 | Refills: 0 | Status: ACTIVE | COMMUNITY
Start: 2024-04-17 | End: 1900-01-01

## 2024-10-16 ENCOUNTER — APPOINTMENT (OUTPATIENT)
Dept: INTERNAL MEDICINE | Facility: CLINIC | Age: 69
End: 2024-10-16

## 2025-01-16 NOTE — H&P ADULT - NSICDXNOPASTSURGICALHX_GEN_ALL_CORE
Plan is for to return to [facility]. Per Angelica from [facility], patient is a long term care bed hold there and can return when medically ready, no precert required. Per GI note today, Elective endoscopy/PEG-J for evaluation once respiratory issues resolve. Begin holding Plavix for possible  EGD/PEG-J Monday or Tuesday. Agree if continued N/V then NG would be warranted. Currently patient denies having N/V or abdominal pain. Per general surgery note today,  patient still adamantly refusing NG tube. Per ID note from today, Monitor off antibiotic, Blood cultures no growth so far and Decadron 6 mg IV daily. Patient is wheel chair bound. Will determine mode of transportation closer to discharge. No Hens needed since patient is from this facility.    Jaquelin Orellana RN   [phone]          <-- Click to add NO significant Past Surgical History

## 2025-03-04 ENCOUNTER — OUTPATIENT (OUTPATIENT)
Dept: OUTPATIENT SERVICES | Facility: HOSPITAL | Age: 70
LOS: 1 days | End: 2025-03-04
Payer: COMMERCIAL

## 2025-03-04 ENCOUNTER — APPOINTMENT (OUTPATIENT)
Dept: MRI IMAGING | Facility: CLINIC | Age: 70
End: 2025-03-04
Payer: COMMERCIAL

## 2025-03-04 DIAGNOSIS — K80.20 CALCULUS OF GALLBLADDER WITHOUT CHOLECYSTITIS WITHOUT OBSTRUCTION: ICD-10-CM

## 2025-03-04 PROCEDURE — 74183 MRI ABD W/O CNTR FLWD CNTR: CPT | Mod: 26

## 2025-03-04 PROCEDURE — 74183 MRI ABD W/O CNTR FLWD CNTR: CPT

## 2025-03-04 PROCEDURE — A9585: CPT

## 2025-06-17 ENCOUNTER — OFFICE (OUTPATIENT)
Facility: LOCATION | Age: 70
Setting detail: OPHTHALMOLOGY
End: 2025-06-17
Payer: COMMERCIAL

## 2025-06-17 ENCOUNTER — RX ONLY (RX ONLY)
Age: 70
End: 2025-06-17

## 2025-06-17 DIAGNOSIS — H01.001: ICD-10-CM

## 2025-06-17 DIAGNOSIS — H40.1311: ICD-10-CM

## 2025-06-17 DIAGNOSIS — H40.1331: ICD-10-CM

## 2025-06-17 DIAGNOSIS — H01.004: ICD-10-CM

## 2025-06-17 DIAGNOSIS — H25.13: ICD-10-CM

## 2025-06-17 PROBLEM — H02.834 DERMATOCHALASIS; RIGHT UPPER LID, RIGHT LOWER LID, LEFT UPPER LID, LEFT LOWER LID: Status: ACTIVE | Noted: 2025-06-17

## 2025-06-17 PROBLEM — H02.835 DERMATOCHALASIS; RIGHT UPPER LID, RIGHT LOWER LID, LEFT UPPER LID, LEFT LOWER LID: Status: ACTIVE | Noted: 2025-06-17

## 2025-06-17 PROBLEM — H35.40 PERIPAPILLARY ATROPHY: Status: ACTIVE | Noted: 2025-06-17

## 2025-06-17 PROBLEM — H02.831 DERMATOCHALASIS; RIGHT UPPER LID, RIGHT LOWER LID, LEFT UPPER LID, LEFT LOWER LID: Status: ACTIVE | Noted: 2025-06-17

## 2025-06-17 PROBLEM — H02.832 DERMATOCHALASIS; RIGHT UPPER LID, RIGHT LOWER LID, LEFT UPPER LID, LEFT LOWER LID: Status: ACTIVE | Noted: 2025-06-17

## 2025-06-17 PROBLEM — H16.223 DRY EYE SYNDROME K SICCA; BOTH EYES: Status: ACTIVE | Noted: 2025-06-17

## 2025-06-17 PROBLEM — H40.1321 PIGMENTARY GLAUCOMA; RIGHT EYE MILD, LEFT EYE MILD: Status: ACTIVE | Noted: 2025-06-17

## 2025-06-17 PROCEDURE — 92083 EXTENDED VISUAL FIELD XM: CPT | Performed by: OPHTHALMOLOGY

## 2025-06-17 PROCEDURE — 76514 ECHO EXAM OF EYE THICKNESS: CPT | Performed by: OPHTHALMOLOGY

## 2025-06-17 PROCEDURE — 92004 COMPRE OPH EXAM NEW PT 1/>: CPT | Performed by: OPHTHALMOLOGY

## 2025-06-17 PROCEDURE — 92020 GONIOSCOPY: CPT | Performed by: OPHTHALMOLOGY

## 2025-06-17 PROCEDURE — 92133 CPTRZD OPH DX IMG PST SGM ON: CPT | Performed by: OPHTHALMOLOGY

## 2025-06-17 ASSESSMENT — REFRACTION_AUTOREFRACTION
OS_CYLINDER: -0.75
OD_AXIS: 135
OD_SPHERE: -5.00
OS_AXIS: 011
OS_SPHERE: +0.75
OD_CYLINDER: -0.25

## 2025-06-17 ASSESSMENT — LID POSITION - DERMATOCHALASIS
OS_DERMATOCHALASIS: LLL LUL 3+
OD_DERMATOCHALASIS: RLL RUL 3+

## 2025-06-17 ASSESSMENT — PACHYMETRY
OS_CT_CORRECTION: >-7
OD_CT_UM: 593
OD_CT_CORRECTION: -4
OS_CT_UM: 655

## 2025-06-17 ASSESSMENT — KERATOMETRY
OD_AXISANGLE_DEGREES: 068
OS_K2POWER_DIOPTERS: 44.75
OD_K1POWER_DIOPTERS: 44.25
OS_AXISANGLE_DEGREES: 088
OD_K2POWER_DIOPTERS: 44.50
OS_K1POWER_DIOPTERS: 44.00

## 2025-06-17 ASSESSMENT — TONOMETRY
OD_IOP_MMHG: 16
OS_IOP_MMHG: 18

## 2025-06-17 ASSESSMENT — CONFRONTATIONAL VISUAL FIELD TEST (CVF)
OS_FINDINGS: FULL
OD_FINDINGS: FULL

## 2025-06-17 ASSESSMENT — LID EXAM ASSESSMENTS
OS_BLEPHARITIS: LUL 2+
OD_BLEPHARITIS: RUL 2+

## 2025-06-17 ASSESSMENT — VISUAL ACUITY
OD_BCVA: 20/25-
OS_BCVA: 20/200

## 2025-06-17 ASSESSMENT — SUPERFICIAL PUNCTATE KERATITIS (SPK)
OD_SPK: 3+
OS_SPK: 3+

## 2025-08-21 ENCOUNTER — APPOINTMENT (OUTPATIENT)
Dept: OPHTHALMOLOGY | Facility: CLINIC | Age: 70
End: 2025-08-21
Payer: COMMERCIAL

## 2025-08-21 ENCOUNTER — NON-APPOINTMENT (OUTPATIENT)
Age: 70
End: 2025-08-21

## 2025-08-21 PROCEDURE — 92134 CPTRZ OPH DX IMG PST SGM RTA: CPT

## 2025-08-21 PROCEDURE — 76514 ECHO EXAM OF EYE THICKNESS: CPT

## 2025-08-21 PROCEDURE — 92083 EXTENDED VISUAL FIELD XM: CPT

## 2025-08-21 PROCEDURE — 92004 COMPRE OPH EXAM NEW PT 1/>: CPT

## 2025-08-21 PROCEDURE — 92025 CPTRIZED CORNEAL TOPOGRAPHY: CPT

## 2025-08-21 PROCEDURE — 92136 OPHTHALMIC BIOMETRY: CPT

## 2025-08-21 PROCEDURE — 92020 GONIOSCOPY: CPT

## 2025-09-16 ENCOUNTER — NON-APPOINTMENT (OUTPATIENT)
Age: 70
End: 2025-09-16

## 2025-09-16 ENCOUNTER — APPOINTMENT (OUTPATIENT)
Dept: OPHTHALMOLOGY | Facility: AMBULATORY SURGERY CENTER | Age: 70
End: 2025-09-16

## 2025-09-17 ENCOUNTER — APPOINTMENT (OUTPATIENT)
Dept: OPHTHALMOLOGY | Facility: CLINIC | Age: 70
End: 2025-09-17
Payer: COMMERCIAL

## 2025-09-17 ENCOUNTER — NON-APPOINTMENT (OUTPATIENT)
Age: 70
End: 2025-09-17

## 2025-09-17 PROCEDURE — 92134 CPTRZ OPH DX IMG PST SGM RTA: CPT

## 2025-09-17 PROCEDURE — 92014 COMPRE OPH EXAM EST PT 1/>: CPT | Mod: 24

## 2025-09-17 PROCEDURE — 99024 POSTOP FOLLOW-UP VISIT: CPT

## 2025-09-17 PROCEDURE — 92201 OPSCPY EXTND RTA DRAW UNI/BI: CPT
